# Patient Record
Sex: FEMALE | Race: BLACK OR AFRICAN AMERICAN | Employment: FULL TIME | URBAN - METROPOLITAN AREA
[De-identification: names, ages, dates, MRNs, and addresses within clinical notes are randomized per-mention and may not be internally consistent; named-entity substitution may affect disease eponyms.]

---

## 2017-01-10 ENCOUNTER — HOSPITAL ENCOUNTER (EMERGENCY)
Age: 22
Discharge: HOME | End: 2017-01-10
Attending: EMERGENCY MEDICINE
Payer: COMMERCIAL

## 2017-01-10 VITALS
BODY MASS INDEX: 18.07 KG/M2 | HEIGHT: 63 IN | OXYGEN SATURATION: 99 % | SYSTOLIC BLOOD PRESSURE: 128 MMHG | TEMPERATURE: 98 F | DIASTOLIC BLOOD PRESSURE: 89 MMHG | WEIGHT: 102 LBS | RESPIRATION RATE: 15 BRPM | HEART RATE: 94 BPM

## 2017-01-10 DIAGNOSIS — R09.81 NASAL CONGESTION: ICD-10-CM

## 2017-01-10 DIAGNOSIS — R51.9 FACIAL PAIN: Primary | ICD-10-CM

## 2017-01-10 PROCEDURE — 99281 EMR DPT VST MAYX REQ PHY/QHP: CPT

## 2017-01-10 RX ORDER — IBUPROFEN 600 MG/1
600 TABLET ORAL
Status: DISCONTINUED | OUTPATIENT
Start: 2017-01-10 | End: 2017-01-10 | Stop reason: HOSPADM

## 2017-01-10 RX ORDER — TRIAMCINOLONE ACETONIDE 55 UG/1
2 SPRAY, METERED NASAL DAILY
Qty: 1 BOTTLE | Refills: 0 | Status: SHIPPED | OUTPATIENT
Start: 2017-01-10

## 2017-01-10 RX ORDER — OXYMETAZOLINE HCL 0.05 %
2 SPRAY, NON-AEROSOL (ML) NASAL 2 TIMES DAILY
Qty: 1 EACH | Refills: 0 | Status: SHIPPED | OUTPATIENT
Start: 2017-01-10 | End: 2017-01-13

## 2017-01-10 NOTE — DISCHARGE INSTRUCTIONS
Head or Face Pain: Care Instructions  Your Care Instructions  Common causes of head or face pain are allergies, stress, and injuries. Other causes include tooth problems and sinus infections. Eating certain foods, such as chocolate or cheese, or drinking certain liquids, such as coffee or cola, can cause head pain for some people. If you have mild head pain, you may not need treatment. It is important to watch your symptoms and talk to your doctor if your pain continues or gets worse. Follow-up care is a key part of your treatment and safety. Be sure to make and go to all appointments, and call your doctor if you are having problems. It's also a good idea to know your test results and keep a list of the medicines you take. How can you care for yourself at home? · Take pain medicines exactly as directed. ¨ If the doctor gave you a prescription medicine for pain, take it as prescribed. ¨ If you are not taking a prescription pain medicine, ask your doctor if you can take an over-the-counter pain medicine. · Take it easy for the next few days or longer if you are not feeling well. · Use a warm, moist towel or heating pad set on low to relax tight muscles in your shoulder and neck. Have someone gently massage your neck and shoulders. · Put ice or a cold pack on the area for 10 to 20 minutes at a time. Put a thin cloth between the ice and your skin. When should you call for help? Call 911 anytime you think you may need emergency care. For example, call if:  · You have twitching, jerking, or a seizure. · You passed out (lost consciousness). · You have symptoms of a stroke. These may include:  ¨ Sudden numbness, tingling, weakness, or loss of movement in your face, arm, or leg, especially on only one side of your body. ¨ Sudden vision changes. ¨ Sudden trouble speaking. ¨ Sudden confusion or trouble understanding simple statements. ¨ Sudden problems with walking or balance.   ¨ A sudden, severe headache that is different from past headaches. · You have jaw pain and pain in your chest, shoulder, neck, or arm. Call your doctor now or seek immediate medical care if:  · You have a fever with a stiff neck or a severe headache. · You have nausea and vomiting, or you cannot keep food or liquids down. Watch closely for changes in your health, and be sure to contact your doctor if:  · Your head or face pain does not get better as expected. Where can you learn more? Go to http://whit-srini.info/. Enter P568 in the search box to learn more about \"Head or Face Pain: Care Instructions. \"  Current as of: May 27, 2016  Content Version: 11.1  © 4886-4912 [employer]. Care instructions adapted under license by [employer] (which disclaims liability or warranty for this information). If you have questions about a medical condition or this instruction, always ask your healthcare professional. [place] [address] any warranty or liability for your use of this information. Rhinitis: Care Instructions  Your Care Instructions  Rhinitis is swelling and irritation in the nose. Allergies and infections are often the cause. Your nose may run or feel stuffy. Other symptoms are itchy and sore eyes, ears, throat, and mouth. If allergies are the cause, your doctor may do tests to find out what you are allergic to. You may be able to stop symptoms if you avoid the things that cause them. Your doctor may suggest or prescribe medicine to ease your symptoms. Follow-up care is a key part of your treatment and safety. Be sure to make and go to all appointments, and call your doctor if you are having problems. It's also a good idea to know your test results and keep a list of the medicines you take. How can you care for yourself at home? · If your rhinitis is caused by allergies, try to find out what sets off (triggers) your symptoms.  Take steps to avoid these triggers. ¨ Avoid yard work. It can stir up both pollen and mold. ¨ Do not smoke or allow others to smoke around you. If you need help quitting, talk to your doctor about stop-smoking programs and medicines. These can increase your chances of quitting for good. ¨ Do not use aerosol sprays, cleaning products, or perfumes. ¨ If pollen is one of your triggers, close your house and car windows during blooming season. ¨ Clean your house often to control dust.  ¨ Keep pets outside. · If your doctor recommends over-the-counter medicines to relieve symptoms, take your medicines exactly as prescribed. Call your doctor if you think you are having a problem with your medicine. · Use saline (saltwater) nasal washes to help keep your nasal passages open and wash out mucus and bacteria. You can buy saline nose drops at a grocery store or drugstore. Or you can make your own at home by adding 1 teaspoon of salt and 1 teaspoon of baking soda to 2 cups of distilled water. If you make your own, fill a bulb syringe with the solution, insert the tip into your nostril, and squeeze gently. Willia Slocumb your nose. When should you call for help? Call your doctor now or seek immediate medical care if:  · You are having trouble breathing. Watch closely for changes in your health, and be sure to contact your doctor if:  · Mucus from your nose gets thicker (like pus) or has new blood in it. · You have new or worse symptoms. · You do not get better as expected. Where can you learn more? Go to http://whit-srini.info/. Enter M030 in the search box to learn more about \"Rhinitis: Care Instructions. \"  Current as of: July 29, 2016  Content Version: 11.1  © 4509-4261 [employer]. Care instructions adapted under license by [employer] (which disclaims liability or warranty for this information).  If you have questions about a medical condition or this instruction, always ask your healthcare jory. [address] any warranty or liability for your use of this information. Saline Nasal Washes: Care Instructions  Your Care Instructions  Saline nasal washes help keep the nasal passages open by washing out thick or dried mucus. This simple remedy can help relieve symptoms of allergies, sinusitis, and colds. It also can make the nose feel more comfortable by keeping the mucous membranes moist. You may notice a little burning sensation in your nose the first few times you use the solution, but this usually gets better in a few days. Follow-up care is a key part of your treatment and safety. Be sure to make and go to all appointments, and call your doctor if you are having problems. It's also a good idea to know your test results and keep a list of the medicines you take. How can you care for yourself at home? · You can buy premixed saline solution in a squeeze bottle or other sinus rinse products at a drugstore. Read and follow the instructions on the label. · You also can make your own saline solution by adding 1 teaspoon of salt and 1 teaspoon of baking soda to 2 cups of distilled water. · If you use a homemade solution, pour a small amount into a clean bowl. Using a rubber bulb syringe, squeeze the syringe and place the tip in the salt water. Pull a small amount of the salt water into the syringe by relaxing your hand. · Sit down with your head tilted slightly back. Do not lie down. Put the tip of the bulb syringe or the squeeze bottle a little way into one of your nostrils. Gently drip or squirt a few drops into the nostril. Repeat with the other nostril. Some sneezing and gagging are normal at first.  · Gently blow your nose. · Wipe the syringe or bottle tip clean after each use. · Repeat this 2 or 3 times a day. · Use nasal washes gently if you have nosebleeds often. When should you call for help?   Watch closely for changes in your health, and be sure to contact your doctor if:  · You often get nosebleeds. · You have problems doing the nasal washes. Where can you learn more? Go to http://whit-srini.info/. Enter [id number] [id number] in the search box to learn more about \"Saline Nasal Washes: Care Instructions. \"  Current as of: July 29, 2016  Content Version: 11.1  © 9581-8695 [employer]. Care instructions adapted under license by [employer] (which disclaims liability or warranty for this information). If you have questions about a medical condition or this instruction, always ask your healthcare professional. [place] [address] any warranty or liability for your use of this information. Thank you for allowing us to provide you with excellent care today. We hope we addressed all of your concerns and needs. We strive to provide excellent quality care in the Emergency Department. Please rate us as excellent, as anything less than excellent does not meet our expectations. If you feel that you have not received excellent quality care or timely care, please ask to speak to the nurse manager. Please choose us in the future for your continued health care needs. The exam and treatment you received in the Emergency Department were for an urgent problem and are not intended as complete care. It is important that you follow-up with a doctor, nurse practitioner, or  [id number] assistant to: (1) confirm your diagnosis, (2) re-evaluation of changes in your illness and treatment, and (3) for ongoing care. If your symptoms become worse or you do not improve as expected and you are unable to reach your usual health care provider, you should return to the Emergency Department. We are available 24 hours a day. Take this sheet with you when you go to your follow-up visit. If you have any problem arranging the follow-up visit, contact the Emergency Department immediately.      Make an appointment with your Primary Care doctor for follow up of this visit. Return to the ER if you are unable to be seen in the time recommended on your discharge instructions. Leo Dmitrysasha Pagé FNP-BC    Pseudoephedrine (By mouth)   Pseudoephedrine ([id number]-rin)  Treats stuffy nose and sinuses. Brand Name(s):12 Hour Decongestant, 12-Hour Cold Relief, Biofed, Children's Sudafed, Congestion, Contac 12-Hour, Genaphed, [facility] Nasal Decongestant, [facility] Suphedrin, [facility] Suphedrine Children's, Good Sense 12 Hour Decongestant, Good Sense Nasal Decongestant, Kodet Se, Nasal Decongestant, Nexafed   There may be other brand names for this medicine. When This Medicine Should Not Be Used: You should not use this medicine if you have ever had an allergic reaction to pseudoephedrine. Do not give any over-the-counter (OTC) cough and cold medicine to a baby or child under 3years old. Using these medicines in very young children might cause serious or possibly life-threatening side effects. How to Use This Medicine:   Long Acting Capsule, Capsule, Long Acting Tablet, Tablet, Liquid, Chewable Tablet  · Your doctor will tell you how much medicine to use. Do not use more than directed. · Follow the instructions on the medicine label if you are using this medicine without a prescription. If this medicine makes you feel restless, avoid taking it at bedtime. · Swallow the capsule or tablet whole. Do not crush, chew, or break. · The chewable tablet must be chewed completely before you swallow it. Do not swallow it whole. · Measure the oral liquid medicine with a marked measuring spoon, oral syringe, or medicine cup. If a dose is missed:   · Take a dose as soon as you remember. If it is almost time for your next dose, wait until then and take a regular dose. Do not take extra medicine to make up for a missed dose.   How to Store and Dispose of This Medicine:   · Store the medicine in a closed container at room temperature, away from heat, moisture, and direct light. · Ask your pharmacist, doctor, or health caregiver about the best way to dispose of any outdated medicine or medicine no longer needed. · Keep all medicine out of the reach of children. Never share your medicine with anyone. Drugs and Foods to Avoid:   Ask your doctor or pharmacist before using any other medicine, including over-the-counter medicines, vitamins, and herbal products. · You should not use pseudoephedrine with cocaine or monoamine oxidase (MAO) inhibitors (Parnate®, Marplan®, Eldepryl®). Warnings While Using This Medicine:   · Check with your doctor before using if you are pregnant or breastfeeding, or if you have heart disease, high blood pressure, diabetes, trouble urinating due to an enlarged prostate, or an overactive thyroid. · You should not use this medicine for more than 7 days unless ordered by a doctor. · If you do not get better after using this medicine for 7 days, or if you have a fever, talk to your doctor. Possible Side Effects While Using This Medicine:   Call your doctor right away if you notice any of these side effects:  · Allergic reaction: Itching or hives, swelling in your face or hands, swelling or tingling in your mouth or throat, chest tightness, trouble breathing  · Chest pain  · Very fast, pounding, or irregular heartbeat  · Wheezing or shortness of breath  · Convulsions (seizures)  · Severe restlessness  · Hallucinations  If you notice these less serious side effects, talk with your doctor:   · Nervousness or dizziness  · Trouble sleeping  · Nausea or upset stomach  · Headache  If you notice other side effects that you think are caused by this medicine, tell your doctor. Call your doctor for medical advice about side effects.  You may report side effects to FDA at 6-989-FDA-0108  © 2016 [address] is for End User's use only and may not be sold, redistributed or otherwise used for commercial purposes. The above information is an  only. It is not intended as medical advice for individual conditions or treatments. Talk to your doctor, nurse or pharmacist before following any medical regimen to see if it is safe and effective for you.

## 2017-01-10 NOTE — ED NOTES
Pt discharged by provider prior to assessment of pt/medication of ibuprofen. This nurse started assessment of pt but got pulled away to a critical pt.

## 2017-01-10 NOTE — ED NOTES
Assumed care of pt. Bed locked and in low position with call bell within reach. Using AIDET-Introduced self as Primary RN and plan discussed with pt with understanding was verbalized. Pt denies additional complaints at this time. White board updated with this nurse's name. Patient advised that medical information will be discussed and it is their own responsibility to tell nurse if such conversation should not take place in the presence of visitors. Pt verbalizes understanding. Pt's spouse at bedside.

## 2017-01-10 NOTE — ED PROVIDER NOTES
HPI Comments: 19 y.o. female with no significant past medical history who presents from home with chief complaint of facial pain. Pt c/o pain of the right face which seems to radiate from the teeth, to the ear and the eye. Pt notes that she has seen a dentist for this pain who was unable to find an infection or a source of pain. Pt reports that she has had URI sxs recently of post nasal drip, congestion and sore throat and has been taking Mucinex as a decongestant. Pt says that her pain worsens with blowing her nose. There are no other acute medical concerns at this time. PCP: Osiris Montilla MD    Note written by Figueroa Alcaraz. Latonya Allen, as dictated by Jefe Nascimento NP 3:38 PM      The history is provided by the patient. No  was used. Past Medical History:   Diagnosis Date    Genital herpes, unspecified      acyclovir no outbreaks       Past Surgical History:   Procedure Laterality Date    Hx other surgical       cryosurgery 2010 reported by patient         No family history on file. Social History     Social History    Marital status: SINGLE     Spouse name: N/A    Number of children: N/A    Years of education: N/A     Occupational History    Not on file. Social History Main Topics    Smoking status: Never Smoker    Smokeless tobacco: Never Used    Alcohol use No    Drug use: Not on file    Sexual activity: Yes     Partners: Male     Other Topics Concern    Not on file     Social History Narrative         ALLERGIES: Review of patient's allergies indicates no known allergies. Review of Systems   Constitutional: Negative. Negative for chills, diaphoresis and fever. HENT: Positive for congestion, postnasal drip and sore throat. Negative for rhinorrhea and trouble swallowing. Right sided facial pain   Eyes: Negative. Respiratory: Negative. Negative for shortness of breath. Cardiovascular: Negative. Gastrointestinal: Negative.   Negative for abdominal pain, nausea and vomiting. Endocrine: Negative. Musculoskeletal: Negative for arthralgias, myalgias, neck pain and neck stiffness. Skin: Negative. Negative for rash. Allergic/Immunologic: Negative. Neurological: Negative. Negative for dizziness, syncope, weakness and headaches. Hematological: Negative. Psychiatric/Behavioral: Negative. All other systems reviewed and are negative. Vitals:    01/10/17 1529   BP: 128/89   Pulse: 94   Resp: 15   Temp: 98 °F (36.7 °C)   SpO2: 99%   Weight: 46.3 kg (102 lb)   Height: 5' 3\" (1.6 m)            Physical Exam   Constitutional: She is oriented to person, place, and time. Vital signs are normal. She appears well-developed and well-nourished. Non-toxic appearance. She does not have a sickly appearance. She does not appear ill. HENT:   Head: Normocephalic and atraumatic. Right Ear: Tympanic membrane is bulging (slightly). Mouth/Throat: Normal dentition. No dental abscesses or dental caries. Posterior oropharyngeal erythema present. 2+ tonsils bilaterally   Eyes: Conjunctivae, EOM and lids are normal. Pupils are equal, round, and reactive to light. Neck: Trachea normal, normal range of motion and full passive range of motion without pain. Neck supple. Cardiovascular: Normal rate, regular rhythm, normal heart sounds and normal pulses. Pulmonary/Chest: Effort normal and breath sounds normal.   Abdominal: Soft. Normal appearance and bowel sounds are normal.   Musculoskeletal: Normal range of motion. Neurological: She is alert and oriented to person, place, and time. She has normal strength. GCS eye subscore is 4. GCS verbal subscore is 5. GCS motor subscore is 6. Skin: Skin is warm, dry and intact. Psychiatric: She has a normal mood and affect. Her speech is normal and behavior is normal. Judgment and thought content normal. Cognition and memory are normal.   Nursing note and vitals reviewed.    Note written by Liborio Aragon. Matt Paredes, as dictated by Cathi Matos NP 3:38 PM      MDM  Number of Diagnoses or Management Options  Facial pain: minor  Nasal congestion: minor  Risk of Complications, Morbidity, and/or Mortality  Presenting problems: minimal  Diagnostic procedures: minimal  Management options: minimal    Patient Progress  Patient progress: stable    ED Course       Procedures    LABORATORY TESTS:  No results found for this or any previous visit (from the past 12 hour(s)). IMAGING RESULTS:    MEDICATIONS GIVEN:  Medications   ibuprofen (MOTRIN) tablet 600 mg (not administered)       IMPRESSION:  1. Facial pain    2. Nasal congestion        PLAN:  1. Afrin and Nasacort  2. Motrin for pain and Sudafed as a decongestant  3. F/U with PCP  Return to ED if worse    Discharge Note  3:55 PM  The patient is ready for discharge. The patient's signs, symptoms, diagnosis, and discharge instructions have been discussed and the patient has conveyed their understanding. The patient is to follow up as recommended or return to the ER should their symptoms worsen. Plan has been discussed and the patient is in agreement. The patient left before nursing could give her the ordered Motrin    Gavin Rodriguez FNP-BC.

## 2017-04-27 ENCOUNTER — HOSPITAL ENCOUNTER (EMERGENCY)
Age: 22
Discharge: HOME | End: 2017-04-28
Attending: EMERGENCY MEDICINE | Admitting: EMERGENCY MEDICINE
Payer: COMMERCIAL

## 2017-04-27 VITALS
TEMPERATURE: 98.5 F | RESPIRATION RATE: 16 BRPM | DIASTOLIC BLOOD PRESSURE: 70 MMHG | WEIGHT: 106.7 LBS | SYSTOLIC BLOOD PRESSURE: 116 MMHG | OXYGEN SATURATION: 98 % | HEART RATE: 88 BPM | HEIGHT: 60 IN | BODY MASS INDEX: 20.95 KG/M2

## 2017-04-27 DIAGNOSIS — J02.9 SORE THROAT: Primary | ICD-10-CM

## 2017-04-27 LAB — DEPRECATED S PYO AG THROAT QL EIA: NEGATIVE

## 2017-04-27 PROCEDURE — 87880 STREP A ASSAY W/OPTIC: CPT | Performed by: PHYSICIAN ASSISTANT

## 2017-04-27 PROCEDURE — 99282 EMERGENCY DEPT VISIT SF MDM: CPT

## 2017-04-27 PROCEDURE — 87070 CULTURE OTHR SPECIMN AEROBIC: CPT | Performed by: EMERGENCY MEDICINE

## 2017-04-28 RX ORDER — CETIRIZINE HCL 10 MG
10 TABLET ORAL DAILY
Qty: 20 TAB | Refills: 0 | Status: SHIPPED | OUTPATIENT
Start: 2017-04-28 | End: 2022-08-25

## 2017-04-28 NOTE — ED PROVIDER NOTES
HPI Comments: Natalia Davis is a 23 y.o. female who presents to the ED with a c/o sore throat. Patient complains of an intermittent sore throat for approx 1 month that has worsened within the past 2 days and is worried about possibly having passed it to her son. Patient states she's also had some congestion. Patient states she has not taken any medications or seen anyone for her sx. Patient denies chest pain, fever, chills, nausea, vomiting, or diarrhea. PCP: Nola Boo MD  PMHx significant for: none  PSHx significant for: none  Social Hx: Tobacco: none. EtOH: none. Illicit drug use: none. There are no further complaints or symptoms at this time. Note written by Latonya Kauffman, as dictated by Meme Lux PA-C 10:53 PM     The history is provided by the patient. No  was used. Past Medical History:   Diagnosis Date    Genital herpes, unspecified     acyclovir no outbreaks       Past Surgical History:   Procedure Laterality Date    HX OTHER SURGICAL      cryosurgery 2010 reported by patient         No family history on file. Social History     Social History    Marital status: SINGLE     Spouse name: N/A    Number of children: N/A    Years of education: N/A     Occupational History    Not on file. Social History Main Topics    Smoking status: Never Smoker    Smokeless tobacco: Never Used    Alcohol use No    Drug use: Not on file    Sexual activity: Yes     Partners: Male     Other Topics Concern    Not on file     Social History Narrative         ALLERGIES: Review of patient's allergies indicates no known allergies. Review of Systems   Constitutional: Negative for chills and fever. HENT: Positive for congestion and sore throat. Negative for ear discharge, ear pain, mouth sores, rhinorrhea and sneezing. Eyes: Negative for redness and visual disturbance. Respiratory: Negative for shortness of breath.     Cardiovascular: Negative for chest pain and leg swelling. Gastrointestinal: Negative for abdominal pain, nausea and vomiting. Genitourinary: Negative for difficulty urinating and frequency. Musculoskeletal: Negative for back pain, myalgias and neck stiffness. Skin: Negative for rash. Neurological: Negative for dizziness, syncope, weakness and headaches. Hematological: Negative for adenopathy. Vitals:    04/27/17 2247   BP: 116/70   Pulse: 88   Resp: 16   Temp: 98.5 °F (36.9 °C)   SpO2: 98%   Weight: 48.4 kg (106 lb 11.2 oz)   Height: 5' (1.524 m)            Physical Exam   Constitutional: She is oriented to person, place, and time. She appears well-developed and well-nourished. No distress. HENT:   Head: Normocephalic and atraumatic. Right Ear: External ear normal.   Left Ear: External ear normal.   Pale boggy nares without rhinorrhea. No erythema, exudate or lesions to oropharynx, uvula midline. No trismus. Handling secretions. Neck: Neck supple. Cardiovascular: Normal rate, regular rhythm, normal heart sounds and intact distal pulses. Exam reveals no gallop and no friction rub. No murmur heard. Pulmonary/Chest: Effort normal and breath sounds normal. No stridor. No respiratory distress. She has no wheezes. She has no rales. She exhibits no tenderness. Abdominal: Soft. Bowel sounds are normal. She exhibits no distension and no mass. There is no tenderness. There is no rebound and no guarding. Musculoskeletal: Normal range of motion. She exhibits no edema, tenderness or deformity. Neurological: She is alert and oriented to person, place, and time. No cranial nerve deficit. Coordination normal.   Skin: No rash noted. No erythema. No pallor. Psychiatric: She has a normal mood and affect. Her behavior is normal.   Nursing note and vitals reviewed.        MDM  Number of Diagnoses or Management Options     Amount and/or Complexity of Data Reviewed  Clinical lab tests: ordered and reviewed  Review and summarize past medical records: yes  Independent visualization of images, tracings, or specimens: yes    Patient Progress  Patient progress: stable    ED Course       Procedures    11:10 PM  Discussed pt, sx, hx and current findings with Dr Akosua Grossman. He is in agreement with nicole Zaldivar. LIZZY Patel        LABORATORY TESTS:  Recent Results (from the past 12 hour(s))   STREP AG SCREEN, GROUP A    Collection Time: 04/27/17 11:01 PM   Result Value Ref Range    Group A Strep Ag ID NEGATIVE  NEG         IMAGING RESULTS:  No orders to display       MEDICATIONS GIVEN:  Medications - No data to display    IMPRESSION:  1. Sore throat        PLAN:  1. Current Discharge Medication List      START taking these medications    Details   cetirizine (ZYRTEC) 10 mg tablet Take 1 Tab by mouth daily. Qty: 20 Tab, Refills: 0         CONTINUE these medications which have NOT CHANGED    Details   triamcinolone (NASACORT) 55 mcg nasal inhaler 2 Sprays by Both Nostrils route daily. Qty: 1 Bottle, Refills: 0      ibuprofen (MOTRIN) 800 mg tablet Take 1 Tab by mouth every six (6) hours as needed for Pain. Qty: 30 Tab, Refills: 1      pnv w/o calcium-iron fum-fa 27-1 mg tab Take 1 Tab by mouth daily. acyclovir (ZOVIRAX) 800 mg tablet Take 800 mg by mouth two (2) times a day. 2.   Follow-up Information     Follow up With Details Comments Wiliam Umana MD Schedule an appointment as soon as possible for a visit 2-4 days for recheck Darlene Annalee CLARKE [address].  [phone]          Return to ED if worse       12:26 AM  Pt has been reexamined. Pt has no new complaints, changes or physical findings. Care plan outlined and precautions discussed. All available results were reviewed with pt. All medications were reviewed with pt. All of pt's questions and concerns were addressed. Pt agrees to F/U as instructed and agrees to return to ED upon further deterioration.  Pt is ready to go home.   Patti Gallagher PA-C

## 2017-04-28 NOTE — DISCHARGE INSTRUCTIONS
Rapid Strep Test: About This Test  What is it? A rapid strep test checks the bacteria in your throat to see if strep is the cause of your sore throat. Why is this test done? It may be done so your doctor can find out right away whether you have strep throat. There is another test for strep, called a throat culture, but that test takes a few days to get the results. How can you prepare for the test?  You don't need to do anything before you have this test.  What happens during the test?  · You will be asked to tilt your head back and open your mouth as wide as possible. · Your doctor will press your tongue down with a flat stick (tongue depressor) and then examine your mouth and throat. · A clean cotton swab will be rubbed over the back of your throat, around your tonsils, and over any red areas or sores to collect a sample. How long does the test take? · The test takes less than a minute. · Results are available in 10 to 15 minutes. When should you call for help? Call your doctor now or seek immediate medical care if:  · Your pain gets worse on one side of your throat, or you have trouble opening your mouth. · You have a new or higher fever, or you have a fever with a stiff neck or severe headache. · Swallowing becomes harder, or you have any trouble breathing. · You are sensitive to light or feel very sleepy or confused. Watch closely for changes in your health, and be sure to contact your doctor if:  · You do not start to feel better after 2 days. Follow-up care is a key part of your treatment and safety. Be sure to make and go to all appointments, and call your doctor if you are having problems. It's also a good idea to keep a list of the medicines you take. Ask your doctor when you can expect to have your test results. Where can you learn more? Go to http://whit-srini.info/.   Enter B356 in the search box to learn more about \"Rapid Strep Test: About This Test.\"  Current as of: July 29, 2016  Content Version: 11.2  © 5159-9497 [employer]. Care instructions adapted under license by [employer] (which disclaims liability or warranty for this information). If you have questions about a medical condition or this instruction, always ask your healthcare professional. [address] any warranty or liability for your use of this information. Sore Throat: Care Instructions  Your Care Instructions    Infection by bacteria or a virus causes most sore throats. Cigarette smoke, dry air, air pollution, allergies, and yelling can also cause a sore throat. Sore throats can be painful and annoying. Fortunately, most sore throats go away on their own. If you have a bacterial infection, your doctor may prescribe antibiotics. Follow-up care is a key part of your treatment and safety. Be sure to make and go to all appointments, and call your doctor if you are having problems. It's also a good idea to know your test results and keep a list of the medicines you take. How can you care for yourself at home? · If your doctor prescribed antibiotics, take them as directed. Do not stop taking them just because you feel better. You need to take the full course of antibiotics. · Gargle with warm salt water once an hour to help reduce swelling and relieve discomfort. Use 1 teaspoon of salt mixed in 1 cup of warm water. · Take an over-the-counter pain medicine, such as acetaminophen (Tylenol), ibuprofen (Advil, Motrin), or naproxen (Aleve). Read and follow all instructions on the label. · Be careful when taking over-the-counter cold or flu medicines and Tylenol at the same time. Many of these medicines have acetaminophen, which is Tylenol. Read the labels to make sure that you are not taking more than the recommended dose. Too much acetaminophen (Tylenol) can be harmful. · Drink plenty of fluids. Fluids may help soothe an irritated throat. Hot fluids, such as tea or soup, may help decrease throat pain. · Use over-the-counter throat lozenges to soothe pain. Regular cough drops or hard candy may also help. These should not be given to young children because of the risk of choking. · Do not smoke or allow others to smoke around you. If you need help quitting, talk to your doctor about stop-smoking programs and medicines. These can increase your chances of quitting for good. · Use a vaporizer or humidifier to add moisture to your bedroom. Follow the directions for cleaning the machine. When should you call for help? Call your doctor now or seek immediate medical care if:  · You have new or worse trouble swallowing. · Your sore throat gets much worse on one side. Watch closely for changes in your health, and be sure to contact your doctor if you do not get better as expected. Where can you learn more? Go to http://whit-srini.info/. Enter [phone] [id number] [phone] in the search box to learn more about \"Sore Throat: Care Instructions. \"  Current as of: July 29, 2016  Content Version: 11.2  © 0955-5474 [employer]. Care instructions adapted under license by [employer] (which disclaims liability or warranty for this information). If you have questions about a medical condition or this instruction, always ask your healthcare professional. [address] any warranty or liability for your use of this information. We hope that we have addressed all of your medical concerns. The examination and treatment you received in the Emergency Department were for an emergent problem and were not intended as complete care. It is important that you follow up with your healthcare provider(s) for ongoing care. If your symptoms worsen or do not improve as expected, and you are unable to reach your usual health care provider(s), you should return to the Emergency Department.       Today's healthcare is undergoing tremendous change, and patient satisfaction surveys are one of the many tools to assess the quality of medical care. You may receive a survey from the [employer] regarding your experience in the Emergency Department. I hope that your experience has been completely positive, particularly the medical care that I provided. As such, please participate in the survey; anything less than excellent does not meet my expectations or intentions. [facility] and [facility] participate in nationally recognized quality of care measures. If your blood pressure is greater than 120/80, as reported below, we urge that you seek medical care to address the potential of high blood pressure, commonly known as hypertension. Hypertension can be hereditary or can be caused by certain medical conditions, pain, stress, or \"white coat syndrome. \"       Please make an appointment with your health care provider(s) for follow up of your Emergency Department visit. VITALS:   Patient Vitals for the past 8 hrs:   Temp Pulse Resp BP SpO2   04/27/17 2247 98.5 °F (36.9 °C) 88 16 116/70 98 %          Thank you for allowing us to provide you with medical care today. We realize that you have many choices for your emergency care needs. Please choose us in the future for any continued health care needs. [facility]: [phone]            Recent Results (from the past 24 hour(s))   STREP AG SCREEN, GROUP A    Collection Time: 04/27/17 11:01 PM   Result Value Ref Range    Group A Strep Ag ID NEGATIVE  NEG         No results found.

## 2017-04-28 NOTE — ED NOTES
JOSE Monroy at bedside discharging patient; instructions reviewed by provider. Patient exited ED prior to RN reassessment.

## 2017-04-28 NOTE — ED NOTES
Pt reports having a sore throat tonight and thinks maybe she gave it to her son. Pt has no other complaints at this time.

## 2017-04-30 LAB
BACTERIA SPEC CULT: NORMAL
SERVICE CMNT-IMP: NORMAL

## 2019-02-28 ENCOUNTER — HOSPITAL ENCOUNTER (EMERGENCY)
Age: 24
Discharge: LEFT BEFORE DISCHARGE | End: 2019-02-28
Attending: EMERGENCY MEDICINE
Payer: COMMERCIAL

## 2019-02-28 PROCEDURE — 75810000275 HC EMERGENCY DEPT VISIT NO LEVEL OF CARE

## 2022-02-09 LAB
ANTIBODY SCREEN, EXTERNAL: NEGATIVE
HBSAG, EXTERNAL: NEGATIVE
HEPATITIS C AB,   EXT: NEGATIVE
HIV, EXTERNAL: NEGATIVE
RPR, EXTERNAL: NON REACTIVE
RUBELLA, EXTERNAL: NORMAL

## 2022-05-18 ENCOUNTER — HOSPITAL ENCOUNTER (OUTPATIENT)
Dept: PERINATAL CARE | Age: 27
Discharge: HOME | End: 2022-05-18
Attending: OBSTETRICS & GYNECOLOGY
Payer: COMMERCIAL

## 2022-05-18 PROCEDURE — 76811 OB US DETAILED SNGL FETUS: CPT | Performed by: OBSTETRICS & GYNECOLOGY

## 2022-05-18 PROCEDURE — 76812 OB US DETAILED ADDL FETUS: CPT | Performed by: OBSTETRICS & GYNECOLOGY

## 2022-06-15 ENCOUNTER — HOSPITAL ENCOUNTER (OUTPATIENT)
Dept: PERINATAL CARE | Age: 27
Discharge: HOME | End: 2022-06-15
Attending: OBSTETRICS & GYNECOLOGY
Payer: COMMERCIAL

## 2022-06-15 PROCEDURE — 76816 OB US FOLLOW-UP PER FETUS: CPT | Performed by: OBSTETRICS & GYNECOLOGY

## 2022-07-06 ENCOUNTER — HOSPITAL ENCOUNTER (INPATIENT)
Age: 27
LOS: 2 days | Discharge: HOME | DRG: 833 | End: 2022-07-10
Attending: OBSTETRICS & GYNECOLOGY | Admitting: STUDENT IN AN ORGANIZED HEALTH CARE EDUCATION/TRAINING PROGRAM
Payer: COMMERCIAL

## 2022-07-06 PROBLEM — Z34.90 PREGNANCY: Status: ACTIVE | Noted: 2022-07-06

## 2022-07-06 LAB
APPEARANCE UR: CLEAR
BACTERIA URNS QL MICRO: NEGATIVE /HPF
BILIRUB UR QL: NEGATIVE
COLOR UR: NORMAL
EPITH CASTS URNS QL MICRO: NORMAL /LPF
FIBRONECTIN FETAL VAG QL: POSITIVE
GLUCOSE UR STRIP.AUTO-MCNC: NEGATIVE MG/DL
HGB UR QL STRIP: NEGATIVE
HYALINE CASTS URNS QL MICRO: NORMAL /LPF (ref 0–2)
KETONES UR QL STRIP.AUTO: NEGATIVE MG/DL
LEUKOCYTE ESTERASE UR QL STRIP.AUTO: NEGATIVE
NITRITE UR QL STRIP.AUTO: NEGATIVE
PH UR STRIP: 7 [PH] (ref 5–8)
PROT UR STRIP-MCNC: NEGATIVE MG/DL
RBC #/AREA URNS HPF: NORMAL /HPF (ref 0–5)
SP GR UR REFRACTOMETRY: 1.01 (ref 1–1.03)
UA: UC IF INDICATED,UAUC: NORMAL
UROBILINOGEN UR QL STRIP.AUTO: 1 EU/DL (ref 0.2–1)
WBC URNS QL MICRO: NORMAL /HPF (ref 0–4)

## 2022-07-06 PROCEDURE — 74011250637 HC RX REV CODE- 250/637: Performed by: OBSTETRICS & GYNECOLOGY

## 2022-07-06 PROCEDURE — 2709999900 HC NON-CHARGEABLE SUPPLY

## 2022-07-06 PROCEDURE — 82731 ASSAY OF FETAL FIBRONECTIN: CPT

## 2022-07-06 PROCEDURE — G0378 HOSPITAL OBSERVATION PER HR: HCPCS

## 2022-07-06 PROCEDURE — 74011250636 HC RX REV CODE- 250/636: Performed by: OBSTETRICS & GYNECOLOGY

## 2022-07-06 PROCEDURE — 81001 URINALYSIS AUTO W/SCOPE: CPT

## 2022-07-06 PROCEDURE — 51702 INSERT TEMP BLADDER CATH: CPT

## 2022-07-06 PROCEDURE — 0T9B70Z DRAINAGE OF BLADDER WITH DRAINAGE DEVICE, VIA NATURAL OR ARTIFICIAL OPENING: ICD-10-PCS | Performed by: OBSTETRICS & GYNECOLOGY

## 2022-07-06 PROCEDURE — 96361 HYDRATE IV INFUSION ADD-ON: CPT

## 2022-07-06 PROCEDURE — 96372 THER/PROPH/DIAG INJ SC/IM: CPT

## 2022-07-06 PROCEDURE — 96366 THER/PROPH/DIAG IV INF ADDON: CPT

## 2022-07-06 PROCEDURE — 96365 THER/PROPH/DIAG IV INF INIT: CPT

## 2022-07-06 RX ORDER — BETAMETHASONE SODIUM PHOSPHATE AND BETAMETHASONE ACETATE 3; 3 MG/ML; MG/ML
12 INJECTION, SUSPENSION INTRA-ARTICULAR; INTRALESIONAL; INTRAMUSCULAR; SOFT TISSUE EVERY 24 HOURS
Status: COMPLETED | OUTPATIENT
Start: 2022-07-06 | End: 2022-07-07

## 2022-07-06 RX ORDER — INDOMETHACIN 25 MG/1
25 CAPSULE ORAL EVERY 4 HOURS
Status: DISPENSED | OUTPATIENT
Start: 2022-07-07 | End: 2022-07-09

## 2022-07-06 RX ORDER — CALCIUM GLUCONATE 20 MG/ML
1 INJECTION, SOLUTION INTRAVENOUS AS NEEDED
Status: DISCONTINUED | OUTPATIENT
Start: 2022-07-06 | End: 2022-07-10 | Stop reason: HOSPADM

## 2022-07-06 RX ORDER — INDOMETHACIN 25 MG/1
50 CAPSULE ORAL
Status: COMPLETED | OUTPATIENT
Start: 2022-07-06 | End: 2022-07-06

## 2022-07-06 RX ORDER — ACYCLOVIR 800 MG/1
400 TABLET ORAL 3 TIMES DAILY
Status: DISCONTINUED | OUTPATIENT
Start: 2022-07-06 | End: 2022-07-10 | Stop reason: HOSPADM

## 2022-07-06 RX ORDER — MAGNESIUM SULFATE HEPTAHYDRATE 40 MG/ML
4 INJECTION, SOLUTION INTRAVENOUS ONCE
Status: COMPLETED | OUTPATIENT
Start: 2022-07-06 | End: 2022-07-06

## 2022-07-06 RX ORDER — MAGNESIUM SULFATE HEPTAHYDRATE 40 MG/ML
2 INJECTION, SOLUTION INTRAVENOUS CONTINUOUS
Status: DISCONTINUED | OUTPATIENT
Start: 2022-07-06 | End: 2022-07-07

## 2022-07-06 RX ORDER — SODIUM CHLORIDE, SODIUM LACTATE, POTASSIUM CHLORIDE, CALCIUM CHLORIDE 600; 310; 30; 20 MG/100ML; MG/100ML; MG/100ML; MG/100ML
75 INJECTION, SOLUTION INTRAVENOUS CONTINUOUS
Status: DISCONTINUED | OUTPATIENT
Start: 2022-07-06 | End: 2022-07-07

## 2022-07-06 RX ORDER — SODIUM CHLORIDE, SODIUM LACTATE, POTASSIUM CHLORIDE, CALCIUM CHLORIDE 600; 310; 30; 20 MG/100ML; MG/100ML; MG/100ML; MG/100ML
75 INJECTION, SOLUTION INTRAVENOUS CONTINUOUS
Status: DISCONTINUED | OUTPATIENT
Start: 2022-07-06 | End: 2022-07-06

## 2022-07-06 RX ADMIN — SODIUM CHLORIDE, POTASSIUM CHLORIDE, SODIUM LACTATE AND CALCIUM CHLORIDE 500 ML: 600; 310; 30; 20 INJECTION, SOLUTION INTRAVENOUS at 14:19

## 2022-07-06 RX ADMIN — ACYCLOVIR 400 MG: 800 TABLET ORAL at 22:05

## 2022-07-06 RX ADMIN — INDOMETHACIN 50 MG: 25 CAPSULE ORAL at 22:05

## 2022-07-06 RX ADMIN — MAGNESIUM SULFATE HEPTAHYDRATE 4 G: 40 INJECTION, SOLUTION INTRAVENOUS at 15:30

## 2022-07-06 RX ADMIN — BETAMETHASONE SODIUM PHOSPHATE AND BETAMETHASONE ACETATE 12 MG: 3; 3 INJECTION, SUSPENSION INTRA-ARTICULAR; INTRALESIONAL; INTRAMUSCULAR at 15:27

## 2022-07-06 RX ADMIN — SODIUM CHLORIDE, SODIUM LACTATE, POTASSIUM CHLORIDE, AND CALCIUM CHLORIDE 75 ML/HR: 600; 310; 30; 20 INJECTION, SOLUTION INTRAVENOUS at 15:30

## 2022-07-06 RX ADMIN — SODIUM CHLORIDE, SODIUM LACTATE, POTASSIUM CHLORIDE, AND CALCIUM CHLORIDE 999 ML/HR: 600; 310; 30; 20 INJECTION, SOLUTION INTRAVENOUS at 21:38

## 2022-07-06 RX ADMIN — MAGNESIUM SULFATE HEPTAHYDRATE 2 G/HR: 40 INJECTION, SOLUTION INTRAVENOUS at 15:53

## 2022-07-06 NOTE — PROGRESS NOTES
1258: Pt arrives ambulatory to L&D, sent from [facility] office by MD Yost or fetal monitoring. Pt told MD Yost that she thinks she lost her mucus plug last night. Pt denies any contraction pain but states that she has back pain but states she has had back pain throughout her pregnancy. Pt is pregnant with twins. Pt has positive fetal movement, denies loss of fluid or vaginal bleeding. Pt denies any complications with this pregnancy so far. Pt denies any significant history. 1326: MD Yost at bedside to see patient. MD using ultrasound to assist this RN in finding placement to put US on both babies. 1331: Pt placed on EFM. Baby A on left and Baby B on right. [id number]: RN remained at bedside trying to keep patient on EFM. 1425: MD Yost made aware of FFN being positive. MD states to keep patient on monitor. MD made aware that TOCO is picking up a lot of irritability d/t patient laying on her side. MD to come up with POC and will jamarcus this RN back with plan. 1527: First dose of betamethasone given at this time in left gluteus gustavo. 1530: Magnesium bolus of 4 grams started at this time. 1553: Magnesium drip of 2g/hr started at this time. 1658: MD Yost at bedside to see patient. 1742: Reactive NST noted on Baby A and Baby B at this time. Verified with Darin Galdamez RN. 1900: Bedside and Verbal shift change report given to Sampson Jones (oncoming nurse) by Meryle Meter RN (offgoing nurse). Report included the following information SBAR, Kardex, Intake/Output, MAR and Recent Results.

## 2022-07-06 NOTE — H&P
History & Physical    Name: Annie Mahoney MRN: [id number]  SSN: [id number]    YOB: 1995  Age: 32 y.o. Sex: female      Subjective:     Reason for Admission:  Pregnancy and  dilation    History of Present Illness: Ms. Leda Eugene is a 32 y.o.  female with an estimated gestational 34 4/7 weeks : None noted. . Patient complains of passing her mucus plug last night. Pregnancy has been complicated by twins. Patient denies abdominal pain  , contractions, vaginal bleeding  and vaginal leaking of fluid . OB History    Para Term  AB Living   2 1 1     1   SAB IAB Ectopic Molar Multiple Live Births           0 1      # Outcome Date GA Lbr True/2nd Weight Sex Delivery Anes PTL Lv   2 Current            1 Term 09/26/15 40w4d  3.535 kg M VAGINAL DELI EPIDURAL AN N KEO     Past Medical History:   Diagnosis Date    Genital herpes, unspecified     acyclovir no outbreaks     Past Surgical History:   Procedure Laterality Date    HX OTHER SURGICAL      cryosurgery  reported by patient     Social History     Occupational History    Not on file   Tobacco Use    Smoking status: Never Smoker    Smokeless tobacco: Never Used   Substance and Sexual Activity    Alcohol use: No    Drug use: Not on file    Sexual activity: Yes     Partners: Male      No family history on file. No Known Allergies  Prior to Admission medications    Medication Sig Start Date End Date Taking? Authorizing Provider   cetirizine (ZYRTEC) 10 mg tablet Take 1 Tab by mouth daily. 17   Chidi Patel PA   triamcinolone (NASACORT) 55 mcg nasal inhaler 2 Sprays by Both Nostrils route daily. 1/10/17   Sarah Tavares, JOANNE   ibuprofen (MOTRIN) 800 mg tablet Take 1 Tab by mouth every six (6) hours as needed for Pain. 9/28/15   Raghav Ellsworth MD   pnv w/o calcium-iron fum-fa 27-1 mg tab Take 1 Tab by mouth daily.     Provider, Historical   acyclovir (ZOVIRAX) 800 mg tablet Take 800 mg by mouth two (2) times a day. Provider, Historical        Review of Systems:  A comprehensive review of systems was negative except for that written in the History of Present Illness. Objective:     Vitals: There were no vitals filed for this visit. No data recorded. No data recorded     Physical Exam:  Patient without distress. Heart: Regular rate and rhythm  Lung: normal respiratory effort  Abdomen: soft, nontender  Fundus: soft and non tender  Cervical Exam: 2 cm dilated    30% effaced    -3 station    Lower Extremities:  - Edema No     Membranes:  Intact  Uterine Activity:  None reported  Fetal Heart Rate:  FHTs x 2.        Lab/Data Review:  No results found for this or any previous visit (from the past 24 hour(s)). Assessment and Plan:29 4/7 wk Twin IUP with  dilation, here to r/o  labor. fFN sent, will monitor. Active Problems:    * No active hospital problems. *     - Twin Pregnancy:  monitor as above.

## 2022-07-06 NOTE — PROGRESS NOTES
High Risk Obstetrics Progress Note    Name: Keshia Figueredo MRN: [id number]  SSN: [id number]    YOB: 1995  Age: 32 y.o. Sex: female      Subjective:      LOS: 0 days    Estimated Date of Delivery: 22   Gestational Age Today: 32w2d     Patient admitted for twins and suspected  labor. States she does have normal fetal movement and does not have vaginal bleeding  and vaginal leaking of fluid . Feels occ tightening. Objective:     Vitals:  Blood pressure 109/68, pulse (!) 101, temperature 98.2 °F (36.8 °C), resp. rate 18, height 5' 3\" (1.6 m), weight 64.4 kg (142 lb), SpO2 100 %, unknown if currently breastfeeding. Temp (24hrs), Av.4 °F (36.9 °C), Min:98.2 °F (36.8 °C), Max:98.6 °F (37 °C)    Systolic (80JSA), ZCH:059 , Min:105 , UOQ:051      Diastolic (22OLD), FYQ:18, Min:60, Max:72       Intake and Output:     Date 22 0700 - 22 0659   Shift 4955-4503 6359-6178 8386-7001 24 Hour Total   INTAKE   I.V.(mL/kg/hr)  205.8  205.8   Shift Total(mL/kg)  205.8(3.2)  205.8(3.2)   OUTPUT   Urine(mL/kg/hr)  550  550   Shift Total(mL/kg)  550(8.5)  550(8.5)   Weight (kg) 64.4 64.4 64.4 64.4       Physical Exam:  Patient without distress. Abdomen: soft, nontender  Fundus: soft and non tender  Lower Extremities:  - Edema No       Membranes:  intact    Uterine Activity:  irritability/UCs every 2-4 min    Fetal Heart Rate:  Stable x 2        Labs:   Recent Results (from the past 36 hour(s))   FETAL FIBRONECTIN    Collection Time: 22  1:37 PM   Result Value Ref Range    Fetal fibronectin Positive (A) NEG         Assessment and Plan:29 4/7 wk twin IUP with apparent PTL on magnesium sulfate and getting steroids. Consider indocin if UCs don't slow down       Active Problems:    Pregnancy (2022)       Twin Pregnancy:  continue current mgmt.

## 2022-07-06 NOTE — PROGRESS NOTES
2022  3:51 PM    CM met with LEONARDO to complete initial assessment and begin discharge planning. MOB verified and confirmed demographics. LEONARDO lives with JUANA- Mahamed Bernabe ( [phone]), along wit her 6yr old, at the address on file. LEONARDO is employed and plans to take 18wks off from work. FOERMIAS is also employed and will be taking adequate time off. LEONARDO reports she has good family support, and feels like she has the support she needs when she returns home. LEONARDO plans to breast  feed baby and would like information on how to order a pump. [facility] will provide follow up care for infant. LEONARDO has car seat, bassinet/crib, clothing, bottles and all necessary supplies for baby. LEONARDO has [employer], howevere MOB noted that she will be adding babies to Medicaid. CM discussed process to add baby to insurance, MOB verbalized understanding. LEONARDO is , 29w4d, twin IUP with  dilation. CM following for needs. Medicare Outpatient Observation Notice (MOON)/ [place] Outpatient Observation Notice (Laila López) provided to patient/representative with verbal explanation of the notice. Time allotted for questions regarding the notice. Patient /representative provided a completed copy of the MOON/VOON notice. CM provided MOB with information for [facility], for breast pump order. Care Management Interventions  PCP Verified by CM: Yes ([facility])  Mode of Transport at Discharge:  Other (see comment)  Transition of Care Consult (CM Consult): Discharge Planning  Support Systems: Spouse/Significant Other,Other Family Member(s)  Confirm Follow Up Transport: Family  Discharge Location  Patient Expects to be Discharged to[de-identified] Home with family assistance  Shanon Gustafson

## 2022-07-07 PROBLEM — O60.03 PRETERM LABOR IN THIRD TRIMESTER WITHOUT DELIVERY: Status: ACTIVE | Noted: 2022-07-07

## 2022-07-07 PROBLEM — O30.043 DICHORIONIC DIAMNIOTIC TWIN PREGNANCY IN THIRD TRIMESTER: Status: ACTIVE | Noted: 2022-07-07

## 2022-07-07 PROBLEM — Z34.90 PREGNANCY: Status: RESOLVED | Noted: 2022-07-06 | Resolved: 2022-07-07

## 2022-07-07 PROCEDURE — 99253 IP/OBS CNSLTJ NEW/EST LOW 45: CPT | Performed by: OBSTETRICS & GYNECOLOGY

## 2022-07-07 PROCEDURE — G0378 HOSPITAL OBSERVATION PER HR: HCPCS

## 2022-07-07 PROCEDURE — 74011250637 HC RX REV CODE- 250/637: Performed by: OBSTETRICS & GYNECOLOGY

## 2022-07-07 PROCEDURE — 76816 OB US FOLLOW-UP PER FETUS: CPT | Performed by: OBSTETRICS & GYNECOLOGY

## 2022-07-07 PROCEDURE — 87081 CULTURE SCREEN ONLY: CPT

## 2022-07-07 PROCEDURE — 96372 THER/PROPH/DIAG INJ SC/IM: CPT

## 2022-07-07 PROCEDURE — 96366 THER/PROPH/DIAG IV INF ADDON: CPT

## 2022-07-07 PROCEDURE — 76819 FETAL BIOPHYS PROFIL W/O NST: CPT | Performed by: OBSTETRICS & GYNECOLOGY

## 2022-07-07 PROCEDURE — 74011250636 HC RX REV CODE- 250/636: Performed by: OBSTETRICS & GYNECOLOGY

## 2022-07-07 RX ADMIN — INDOMETHACIN 25 MG: 25 CAPSULE ORAL at 10:00

## 2022-07-07 RX ADMIN — MAGNESIUM SULFATE HEPTAHYDRATE 2 G/HR: 40 INJECTION, SOLUTION INTRAVENOUS at 01:49

## 2022-07-07 RX ADMIN — INDOMETHACIN 25 MG: 25 CAPSULE ORAL at 16:27

## 2022-07-07 RX ADMIN — MAGNESIUM SULFATE HEPTAHYDRATE 2 G/HR: 40 INJECTION, SOLUTION INTRAVENOUS at 12:42

## 2022-07-07 RX ADMIN — SODIUM CHLORIDE, SODIUM LACTATE, POTASSIUM CHLORIDE, AND CALCIUM CHLORIDE 75 ML/HR: 600; 310; 30; 20 INJECTION, SOLUTION INTRAVENOUS at 04:37

## 2022-07-07 RX ADMIN — BETAMETHASONE SODIUM PHOSPHATE AND BETAMETHASONE ACETATE 12 MG: 3; 3 INJECTION, SUSPENSION INTRA-ARTICULAR; INTRALESIONAL; INTRAMUSCULAR at 16:24

## 2022-07-07 RX ADMIN — ACYCLOVIR 400 MG: 800 TABLET ORAL at 09:00

## 2022-07-07 RX ADMIN — ACYCLOVIR 400 MG: 800 TABLET ORAL at 16:27

## 2022-07-07 RX ADMIN — INDOMETHACIN 25 MG: 25 CAPSULE ORAL at 20:58

## 2022-07-07 RX ADMIN — INDOMETHACIN 25 MG: 25 CAPSULE ORAL at 03:32

## 2022-07-07 RX ADMIN — ACYCLOVIR 400 MG: 800 TABLET ORAL at 22:19

## 2022-07-07 NOTE — PROGRESS NOTES
0700 - Verbal shift change report given to SE Shukla, THU and GIANNI Taylor RN (oncoming nurse) by Elliot Grover. Madonna Galicia RN (offgoing nurse). Report included the following information SBAR, Kardex, Intake/Output, MAR, Recent Results and Med Rec Status. 1500 - Patient to [facility]. 1620 - Patient back from [facility]. 1900 - Verbal shift change report given to Apurva Cantor RN (oncoming nurse) by Elliot Grover. Tila Shukla RN and GIANNI Taylor RN (offgoing nurse). Report included the following information SBAR, Kardex, Intake/Output, MAR, Recent Results and Med Rec Status.

## 2022-07-07 NOTE — CONSULTS
MATERNAL FETAL MEDICINE  INPATIENT CONSULTATION    REQUESTED BY: David Cardozo MD   DATE OF CONSULT: 22    REASON FOR CONSULTATION:  labor    Tien Marie is a 32 y.o. female  at 34w10d. HPI  Maia Palma came to L&D yesterday after she thought she lost her mucus plug. She was having vaginal tightening, but did not think she was torsten. She was having contractions on EFM. She was dilated 2 cm and was started on indomethacin for tocolysis. Maia Palma has received her first dose of  corticosteroids. She has completed her magnesium for neuroprotection. She says that she is no longer feeling contractions.     Patient Active Problem List   Diagnosis Code    Dichorionic diamniotic twin pregnancy in third trimester O34.200     labor in third trimester without delivery O60.03       Past Medical History:   Diagnosis Date    Genital herpes, unspecified     acyclovir no outbreaks       Past Surgical History:   Procedure Laterality Date    HX OTHER SURGICAL      cryosurgery  reported by patient       OB History    Para Term  AB Living   2 1 1 0 0 1   SAB IAB Ectopic Molar Multiple Live Births   0 0 0 0 0 1       No Known Allergies      Current Facility-Administered Medications:     magnesium sulfate 20 gm/500 mL Sterile Water infusion, 2 g/hr, IntraVENous, CONTINUOUS, David Cardozo MD, Stopped at 22 1453    calcium gluconate 1 gram in sodium chloride (ISO-OSM) 50 mL infusion, 1 g, IntraVENous, PRN, David Cardozo MD    betamethasone (CELESTONE) injection 12 mg, 12 mg, IntraMUSCular, Q24H, Agus Yost MD, 12 mg at 22 1527    lactated Ringers infusion, 75 mL/hr, IntraVENous, CONTINUOUS, David Cardozo MD, Stopped at 22 1453    acyclovir (ZOVIRAX) tablet 400 mg, 400 mg, Oral, TID, Trista Nelson MD, 400 mg at 22 0900    indomethacin (INDOCIN) capsule 25 mg, 25 mg, Oral, Q4H, Deniz Nelson MD, 25 mg at 22 1000    Social History     Tobacco Use    Smoking status: Never Smoker    Smokeless tobacco: Never Used   Substance Use Topics    Alcohol use: No    Drug use: Not on file       History reviewed. No pertinent family history. Review of Systems   Constitutional: Negative. HENT: Negative. Eyes: Negative. Respiratory: Negative. Cardiovascular: Negative. Gastrointestinal: Negative. Genitourinary: Negative. Musculoskeletal: Negative. Skin: Negative. Neurological: Negative. Endo/Heme/Allergies: Negative. Psychiatric/Behavioral: Negative. Visit Vitals  /65   Pulse 94   Temp 97.7 °F (36.5 °C)   Resp 16   Ht 5' 3\" (1.6 m)   Wt 64.4 kg (142 lb)   SpO2 98%   BMI 25.15 kg/m²       Gen: Comfortable, NAD  Resp: Comfortable respirations  CV: Well perfused  Abd: Gravid, NT, ND  Ext: Moving all extremities well  Neuro: Alert, interactive, appropriate    Recent Results (from the past 24 hour(s))   URINALYSIS W/ REFLEX CULTURE    Collection Time: 22  6:35 PM    Specimen: Urine   Result Value Ref Range    Color YELLOW/STRAW      Appearance CLEAR CLEAR      Specific gravity 1.011 1.003 - 1.030      pH (UA) 7.0 5.0 - 8.0      Protein Negative NEG mg/dL    Glucose Negative NEG mg/dL    Ketone Negative NEG mg/dL    Bilirubin Negative NEG      Blood Negative NEG      Urobilinogen 1.0 0.2 - 1.0 EU/dL    Nitrites Negative NEG      Leukocyte Esterase Negative NEG      UA:UC IF INDICATED CULTURE NOT INDICATED BY UA RESULT CNI      WBC 0-4 0 - 4 /hpf    RBC 0-5 0 - 5 /hpf    Epithelial cells FEW FEW /lpf    Bacteria Negative NEG /hpf    Hyaline cast 0-2 0 - 2 /lpf       ULTRASOUND:  Twin A:  Cephalic, maternal left  EFW 1223 gms (18%)  BPP 8/8    Twin B:  Breech, maternal right  EFW 1338 gms (36%)  BPP 8/8    Intertwin discordance 8%    ASSESSMENT:    32 y.o. female  at 34w10d with dichorionic/diamniotic twin pregnancy and  labor.     Taishianna's contractions have resolved with indomethacin for tocolysis. We discussed her risk for  delivery. We also discussed observation after we discontinue tocolysis to monitor for labor. If she has contractions after discontinuation of indomethacin, consider nifedipine for tocolysis. PLAN:     Complete  corticosteroid course   Continue indomethacin for tocolysis for 48 hours   Fetal status reassuring X 2   Monitor for signs or symptoms of labor   Observe for at least 24 hours after indomethacin has been discontinued. The patient visit was approximately 60 minutes with greater than half spent in face-to face counseling and coordination of care.     Rere Hilton MD  Maternal Fetal Medicine

## 2022-07-07 NOTE — PROGRESS NOTES
Progress Note    Pj Guy  [id number]  1995   29w4d      S:  Feeling increasing pain with contractions. Breathing through them and feeling they are getting stronger and more frequent. O:    Visit Vitals  /65 (BP 1 Location: Left upper arm, BP Patient Position: At rest)   Pulse (!) 111   Temp 98.2 °F (36.8 °C)   Resp 18   Ht 5' 3\" (1.6 m)   Wt 64.4 kg (142 lb)   SpO2 100%   BMI 25.15 kg/m²     Cervix 2/50/-3, soft      Patient Vitals for the past 4 hrs: Mode Fetal Heart Rate Variability Decelerations Accelerations RN Reviewed Strip?   22 US Readjusted -- -- -- -- --   22 External 130 6-25 BPM None Yes Yes     Patient Vitals for the past 4 hrs: Mode - Baby B Fetal Heart Rate - Baby B Variability - Baby B Decelerations - Baby B Accelerations - Baby B RN Reviewed Strip - Baby B?   22 US Readjusted -- -- -- -- --   22 1900 External 145 6-25 BPM None No Yes     [place] -  Irritability and irregular contractions q2-5 minutes     BSUS done and Twin A - vertex   Twin B - breech to transverse. A/P:  32 y.o.  @ 29w4d - threatened PTL    S/p BMTZ  On magnesium for tocolysis and neuroprotection. Will start indocin now. Received a fluid bolus. Sending GBS now. If continues to contract/labors will start GBS ppx. Continue close monitoring. Planning on  if labors.       Kristen Smith MD  [address] [place] [address] [facility]

## 2022-07-07 NOTE — PROGRESS NOTES
1900: Bedside and Verbal shift change report given to Sampson Jones (oncoming nurse) by Naomi Samaniego RN (offgoing nurse). Report included the following information SBAR, Kardex, Intake/Output, MAR and Recent Results. 2030Rahel Ware RN overseeing care of this pt from 2030 to 2330.     0700: Bedside and Verbal shift change report given to Naomi Lira RN and Cyndy Rodriguez RN (oncoming nurse) by Sampson Jones (offgoing nurse). Report included the following information SBAR, Kardex, Intake/Output, MAR and Recent Results.

## 2022-07-07 NOTE — PROGRESS NOTES
AntePartum Progress Note    Leonardo Law  22P4C    Patient admitted for  labor 29w5d Estimated Date of Delivery: 22     Able to sleep on and off last night but still tired this AM. Was feeling some cramping, none at this time. No bleeding, LOF. Good FM. Vitals:  Patient Vitals for the past 24 hrs:   BP Temp Pulse Resp SpO2 Height Weight   22 0730 (!) 101/59 97.9 °F (36.6 °C) 96 15 99 % -- --   22 0531 103/65 -- 89 14 100 % -- --   22 0431 (!) 100/57 -- 100 14 98 % -- --   22 0331 (!) 100/59 97.8 °F (36.6 °C) 98 16 99 % -- --   22 0232 (!) 100/56 -- 93 14 99 % -- --   22 0132 (!) 100/58 -- 95 -- -- -- --   22 0031 120/70 -- 100 14 99 % -- --   22 2330 113/69 -- 94 16 100 % -- --   22 2230 114/70 97.8 °F (36.6 °C) 96 15 100 % -- --   22 2130 109/69 -- 99 14 100 % -- --   22 2030 113/69 -- 96 15 100 % -- --   22 1930 108/68 98.4 °F (36.9 °C) (!) 105 14 100 % -- --   22 1832 101/65 -- (!) 111 18 100 % -- --   22 1730 105/69 -- (!) 105 17 100 % -- --   22 1701 111/65 -- 99 18 100 % -- --   22 1630 109/68 -- (!) 101 18 100 % -- --   22 1600 112/71 -- (!) 103 18 100 % -- --   22 1545 105/60 -- (!) 109 18 98 % -- --   22 1530 111/68 98.2 °F (36.8 °C) 97 17 100 % -- --   22 1423 112/72 98.6 °F (37 °C) (!) 104 17 100 % -- --   22 1355 -- -- -- -- -- 5' 3\" (1.6 m) 64.4 kg (142 lb)     Temp (24hrs), Av.1 °F (36.7 °C), Min:97.8 °F (36.6 °C), Max:98.6 °F (37 °C)    I&O:   701 - 1900  In: 125 [I.V.:125]  Out: 200 [Urine:200]              190 -  0700  In: 2279.5 [I.V.:2279.5]  Out: 8917 [Urine:3590]  NST:  Reactive  Uterine Activity: irritability    Exam:  Patient without distress.                Abdomen soft, non-tender               Fundus soft and non tender               Lower extremities edema No                           Cervix 50/-3  2200 Labs:   Recent Results (from the past 24 hour(s))   FETAL FIBRONECTIN    Collection Time: 22  1:37 PM   Result Value Ref Range    Fetal fibronectin Positive (A) NEG     URINALYSIS W/ REFLEX CULTURE    Collection Time: 22  6:35 PM    Specimen: Urine   Result Value Ref Range    Color YELLOW/STRAW      Appearance CLEAR CLEAR      Specific gravity 1.011 1.003 - 1.030      pH (UA) 7.0 5.0 - 8.0      Protein Negative NEG mg/dL    Glucose Negative NEG mg/dL    Ketone Negative NEG mg/dL    Bilirubin Negative NEG      Blood Negative NEG      Urobilinogen 1.0 0.2 - 1.0 EU/dL    Nitrites Negative NEG      Leukocyte Esterase Negative NEG      UA:UC IF INDICATED CULTURE NOT INDICATED BY UA RESULT CNI      WBC 0-4 0 - 4 /hpf    RBC 0-5 0 - 5 /hpf    Epithelial cells FEW FEW /lpf    Bacteria Negative NEG /hpf    Hyaline cast 0-2 0 - 2 /lpf       Assessment and Plan:   IUP @ 29w5d   Patient Active Problem List    Diagnosis Date Noted    Pregnancy 2022    Pregnant 2015     Threatened  labor: feeling better this AM, irritability, cervix stable at 2cm. - BMZ #2 at  today  - mag for 24hrs for neuroprotection (off at 1530)---can pause for MFM consultation if needed  - regular diet this AM  - MFM   - GBS culture pending  - cont acyclovir for HSV ppx  - continue indocin  - dispo: pending-anticipate monitoring for at least 24-48hrs without contractions.  Pt does live 20min away    Juan Krueger MD  [facility]

## 2022-07-07 NOTE — PROGRESS NOTES
1900: Bedside and Verbal shift change report given to PATRICK Crocker RN (oncoming nurse) by Damian Freeman. Basil Dunbar RN and GIANNI Perez RN (offgoing nurse). Report included the following information SBAR, Kardex, MAR and Recent Results. 2128: This RN at bedside to place patient on monitor for NST.    2215: Pt taken off EFM. Reactive reviewed with DAVID Ureña RN    1820: This RN at bedside to reassess patient. Pt resting comfortably with no complaints at this time. 0710: Bedside and Verbal shift change report given to COLLEEN Dunbar RN (oncoming nurse) by PATRICK Crocker RN (offgoing nurse). Report included the following information SBAR, Kardex, Intake/Output, MAR and Recent Results.

## 2022-07-07 NOTE — PROGRESS NOTES
2138 - Pt reports feeling more contraction pain. RN notifying Dr. Ioana Rice. VORB from Dr. Ioana Rice to administer 500 mL LR IVF bolus. RN starting bolus at this time. 2205 - RN administering acyclovir and indomethacin at this time. 2216 - Dr. Ioana Rice at pt bedside performing US at this time. Pt removed from [facility]. Baby A vertex; Baby B possibly transverse. 2223 - SVE by Dr. Ioana Rice (2/50/-3). 2234 - Pt back on EFM at this time.

## 2022-07-08 PROBLEM — O60.00 PRETERM LABOR: Status: ACTIVE | Noted: 2022-07-08

## 2022-07-08 PROCEDURE — G0378 HOSPITAL OBSERVATION PER HR: HCPCS

## 2022-07-08 PROCEDURE — 65270000029 HC RM PRIVATE

## 2022-07-08 PROCEDURE — 74011250637 HC RX REV CODE- 250/637: Performed by: OBSTETRICS & GYNECOLOGY

## 2022-07-08 PROCEDURE — 74011250637 HC RX REV CODE- 250/637: Performed by: STUDENT IN AN ORGANIZED HEALTH CARE EDUCATION/TRAINING PROGRAM

## 2022-07-08 RX ORDER — BUTALBITAL, ACETAMINOPHEN AND CAFFEINE 50; 325; 40 MG/1; MG/1; MG/1
2 TABLET ORAL ONCE
Status: COMPLETED | OUTPATIENT
Start: 2022-07-08 | End: 2022-07-08

## 2022-07-08 RX ORDER — ACETAMINOPHEN 500 MG
1000 TABLET ORAL
Status: DISCONTINUED | OUTPATIENT
Start: 2022-07-08 | End: 2022-07-10 | Stop reason: HOSPADM

## 2022-07-08 RX ORDER — ZOLPIDEM TARTRATE 5 MG/1
5 TABLET ORAL
Status: DISCONTINUED | OUTPATIENT
Start: 2022-07-08 | End: 2022-07-10 | Stop reason: HOSPADM

## 2022-07-08 RX ADMIN — BUTALBITAL, ACETAMINOPHEN, AND CAFFEINE 2 TABLET: 50; 325; 40 TABLET ORAL at 22:45

## 2022-07-08 RX ADMIN — ACYCLOVIR 400 MG: 800 TABLET ORAL at 22:46

## 2022-07-08 RX ADMIN — INDOMETHACIN 25 MG: 25 CAPSULE ORAL at 01:39

## 2022-07-08 RX ADMIN — INDOMETHACIN 25 MG: 25 CAPSULE ORAL at 09:44

## 2022-07-08 RX ADMIN — ACYCLOVIR 400 MG: 800 TABLET ORAL at 09:44

## 2022-07-08 RX ADMIN — BUTALBITAL, ACETAMINOPHEN, AND CAFFEINE 2 TABLET: 50; 325; 40 TABLET ORAL at 18:15

## 2022-07-08 RX ADMIN — INDOMETHACIN 25 MG: 25 CAPSULE ORAL at 22:46

## 2022-07-08 RX ADMIN — ACETAMINOPHEN 1000 MG: 500 TABLET ORAL at 14:34

## 2022-07-08 RX ADMIN — INDOMETHACIN 25 MG: 25 CAPSULE ORAL at 05:38

## 2022-07-08 RX ADMIN — ACETAMINOPHEN 1000 MG: 500 TABLET ORAL at 08:28

## 2022-07-08 RX ADMIN — INDOMETHACIN 25 MG: 25 CAPSULE ORAL at 13:35

## 2022-07-08 RX ADMIN — ACYCLOVIR 400 MG: 800 TABLET ORAL at 16:41

## 2022-07-08 RX ADMIN — INDOMETHACIN 25 MG: 25 CAPSULE ORAL at 18:05

## 2022-07-08 NOTE — PROGRESS NOTES
Patient assessed at bedside. Feels well overall, still has mild headache. Denies ctx, VB, LOF, endorses fetal movement. No current complaints, patient resting comfortably in bed.

## 2022-07-08 NOTE — PROGRESS NOTES
1810 This RN at PT bedside assisting COLLEEN Rodriguez with med admin. PT reporting a very intense headache that tylenol doesn't help. This RN leaving room to speak with Dr. Audrey Whitehead regarding meds for HA. MD says PT can have fiorcet (see MAR).

## 2022-07-08 NOTE — PROGRESS NOTES
0740 Assumed care of patient. Patient asleep in bed.    0825 Patient c/o H/A, rates 7/10 pain scale. States she had it since yesterday but tries not to take medication and toughed it out. Dr. Geoff Augustin notified, order for tylenol prn. Denies visual disturbances, N/V, RUQ pain. Voiding without difficulty. Denies vaginal bleeding or loss of fluid. 1711 Tylenol administered per prn orders. 0655 EFM applied for NST tracing, Baby A and Marco Antonio Augustin to bedside to discuss POC. 1005 Patient consuming breakfast at this time. 1010 NST tracing reviewed and verified with charge nurse Frederick Gunn, 1071 [facility] Bedside shift change report given to [address] (oncoming nurse) by [facility] [place] RN (offgoing nurse). Report included the following information SBAR, Kardex, MAR and Recent Results.

## 2022-07-08 NOTE — PROGRESS NOTES
AntePartum Progress Note    Asa Chin  80W3Z    Patient admitted for  labor in setting of di/di twin pregnancy 29w6d Estimated Date of Delivery: 22 states she does not  have  contractions, vaginal bleeding  and vaginal leaking of fluid . Reports headache this morning, received tylenol. Vitals:  Patient Vitals for the past 24 hrs:   BP Temp Pulse Resp SpO2   22 0753 (!) 98/55 98.5 °F (36.9 °C) 82 16 99 %   22 0536 (!) 100/53 98.1 °F (36.7 °C) 87 14 100 %   22 2054 (!) 105/56 98 °F (36.7 °C) 98 14 100 %   22 1629 114/61 -- -- -- --   22 1454 111/65 97.7 °F (36.5 °C) -- -- --   22 1430 104/61 -- 95 16 100 %   22 1331 (!) 102/59 -- 94 16 98 %   22 1130 102/61 -- 98 16 99 %   22 1030 105/63 -- 96 16 99 %   22 0930 105/67 -- 94 16 99 %     Temp (24hrs), Av.1 °F (36.7 °C), Min:97.7 °F (36.5 °C), Max:98.5 °F (36.9 °C)    I&O:   No intake/output data recorded.  1901 -  0700  In: 2670.3 [I.V.:2670.3]  Out: 4310 [Urine:4310]  NST:  Reactive  Uterine Activity: 2 contractions on toco in 1 hour    Exam:  Patient without distress. Abdomen soft, non-tender               Fundus soft and non tender               Lower extremities edema No                                           Labs: No results found for this or any previous visit (from the past 24 hour(s)). Assessment and Plan:   IUP @ 29w6d   Patient Active Problem List    Diagnosis Date Noted   [facility] Dichorionic diamniotic twin pregnancy in third trimester 2022     labor in third trimester without delivery 2022     28yo  at 29w6d presenting in  labor with di/di twin pregnancy   - s/p betamethasone x2 doses for fetal lung maturity  - fetal status reassuring  - continue indomethacin for tocolysis for 48 hours  - continue to monitor for signs and symptoms of labor  - s/p MFM consultation -- appreciate recommendations.

## 2022-07-09 PROCEDURE — 74011250636 HC RX REV CODE- 250/636: Performed by: OBSTETRICS & GYNECOLOGY

## 2022-07-09 PROCEDURE — 65270000029 HC RM PRIVATE

## 2022-07-09 PROCEDURE — 74011250637 HC RX REV CODE- 250/637: Performed by: STUDENT IN AN ORGANIZED HEALTH CARE EDUCATION/TRAINING PROGRAM

## 2022-07-09 PROCEDURE — 74011250637 HC RX REV CODE- 250/637: Performed by: OBSTETRICS & GYNECOLOGY

## 2022-07-09 RX ORDER — OXYCODONE HYDROCHLORIDE 5 MG/1
5 TABLET ORAL
Status: DISCONTINUED | OUTPATIENT
Start: 2022-07-09 | End: 2022-07-10 | Stop reason: HOSPADM

## 2022-07-09 RX ORDER — GUAIFENESIN 600 MG/1
600 TABLET, EXTENDED RELEASE ORAL EVERY 12 HOURS
Status: DISCONTINUED | OUTPATIENT
Start: 2022-07-09 | End: 2022-07-10 | Stop reason: HOSPADM

## 2022-07-09 RX ORDER — PROCHLORPERAZINE EDISYLATE 5 MG/ML
10 INJECTION INTRAMUSCULAR; INTRAVENOUS
Status: DISCONTINUED | OUTPATIENT
Start: 2022-07-09 | End: 2022-07-10 | Stop reason: HOSPADM

## 2022-07-09 RX ADMIN — ACYCLOVIR 400 MG: 800 TABLET ORAL at 08:10

## 2022-07-09 RX ADMIN — ACYCLOVIR 400 MG: 800 TABLET ORAL at 17:11

## 2022-07-09 RX ADMIN — ACETAMINOPHEN 1000 MG: 500 TABLET ORAL at 18:22

## 2022-07-09 RX ADMIN — ACETAMINOPHEN 1000 MG: 500 TABLET ORAL at 12:06

## 2022-07-09 RX ADMIN — ACYCLOVIR 400 MG: 800 TABLET ORAL at 22:17

## 2022-07-09 RX ADMIN — GUAIFENESIN 600 MG: 600 TABLET ORAL at 17:17

## 2022-07-09 RX ADMIN — PROCHLORPERAZINE EDISYLATE 10 MG: 5 INJECTION INTRAMUSCULAR; INTRAVENOUS at 20:35

## 2022-07-09 NOTE — PROGRESS NOTES
1215 Pt c/o headache 4/10. Pt stated she began to feel pressure in her sinus area that began this morning and now is feeling pain and pressure. Tylenol 1000mg given. Pt denies any other complaints. 12 Pt place on monitor for NST.    1400 Difficulty juany baby B, efm adjusted multiple times. 1600 Pt verbalized she is more aware of her ctx today than yesterday. Pt stated \" I feel them but it's not painful\". 56 Dr Hazel Malloy updated on fetal heart tracing. Aware baby A had two variables at 1351 and 1421 with none since. Aware pt is having irregular ctx that are not painful but she is more aware of them today than yesterday. [address] [facility] [address] with Dr Hazel Malloy on the phone. Orders received for the pt to stay until tomorrow for continued observation. EFM's to come off with continuous toco to monitor ctx.     1655 Dr Dodson at bedside to introduce himself to pt. Pt expresses no needs at this time. 1700 Pt c/o sinus pressure and is requesting medication. Will notify Dr Dodson. 1717 Pt given Mucinex 600mg to treat sinus pressure. Orders received per Hien Ricketts.     Shift report given to Nereida Warren RN

## 2022-07-09 NOTE — PROGRESS NOTES
0700:Bedside and Verbal shift change report given to S. Corlis Hatchet RN (oncoming nurse) by Faiza Lundberg Sa, RN (offgoing nurse). Report included the following information SBAR, Kardex, Procedure Summary, Intake/Output, MAR, Recent Results, Med Rec Status and Quality Measures. 4390: scheduled medication given. VSS. Linens changed. 0825: Pt placed on EFM/TOCO for NST at this time. Pt states that contractions are very mild, intermittent, andnot causing pt discomfort. pt endorses positive FM. Pt denies HA at this time. 0920: Dr. Kevon Mcdonald fetal tracing, per MD- NST reactive. Per MD [place]- place pt back on EMF/TOCO for NST between 3694-9991, if fetal tracing reactive and contractions have not worsened, pt may be discharged home today. 1045: SBAR given to COLLEEN Moore RN at this time.

## 2022-07-09 NOTE — PROGRESS NOTES
1900 SBAR report received from off going rn COLLEEN Sim    2020 Pt reports still having a headache after taking tylenol and mucinex and unable to focus on ctx pattern. Requesting more medication. Dr Brit Bautista made aware and ordered to give Compazine 10mg IV for headache. 2049 Pt placed on EFM for NST. 2130 Pt reports less pain with headache at this time. Rates pain 4/10 and will attempt to get some sleep. Pt reports ctx are less intense at this time than they were during the day. [id number] this RN at bedside staying at bedside to continuously trace FHR on baby A & B and audibly heard [facility]    0515 Pt reports only a dull headache at this time and denies need for medication. Will notify rn if any changes    SBAR report given to oncoming amy Valdez

## 2022-07-09 NOTE — PROGRESS NOTES
High Risk Obstetrics Progress Note    Name: Ludwin Corcoran MRN: [id number]  SSN: [id number]    YOB: 1995  Age: 32 y.o. Sex: female      Subjective:      LOS: 1 day    Estimated Date of Delivery: 22   Gestational Age Today: 26w0d     Patient admitted for  labor and twins. States she does have normal fetal movement and does not have vaginal bleeding  and vaginal leaking of fluid . Reports occ tightening. Objective:     Vitals:  Blood pressure 113/69, pulse 81, temperature 98.2 °F (36.8 °C), resp. rate 16, height 5' 3\" (1.6 m), weight 64.4 kg (142 lb), SpO2 98 %, unknown if currently breastfeeding. Temp (24hrs), Av.3 °F (36.8 °C), Min:98.2 °F (36.8 °C), Max:98.4 °F (28.3 °C)    Systolic (06EYH), ZLF:993 , Min:102 , AYY:485      Diastolic (39ABI), HBP:91, Min:52, Max:69       Intake and Output:         Physical Exam:  Patient without distress. Abd: gravid, NT  Lower Extremities:  - No cords or calf tenderness. Membranes:  Intact    Uterine Activity:  Irregular, pt not feeling most of them    Fetal Heart Rate:  reasuring x 2        Labs: No results found for this or any previous visit (from the past 36 hour(s)). Assessment and Plan:Twin IUP at 30 weeks with PTL and s/p steroids, Magnesium and indocin. Will monitor today off of meds and if stable discharge to home later. Principal Problem:     labor in third trimester without delivery (2022)    Active Problems:    Dichorionic diamniotic twin pregnancy in third trimester (2022)       labor (2022)        Labor:  discharge to home later todayif stable   Twin Pregnancy:  as above.

## 2022-07-09 NOTE — PROGRESS NOTES
1900: Bedside and Verbal shift change report given to Eboni Carlos RN (oncoming nurse) by Asa Medina RN (offgoing nurse). Report included the following information SBAR, Kardex, Intake/Output, MAR and Recent Results. 2003: [place] and US placed on pt for NST. This RN remained at bedside during fetal tracing. 2044: EFM turned off.    2045: Dr Ashutosh Dao reviewing NST and verified as reactive at this time. 2239: This RN spoke with Dr Ashutosh Dao in regards to pt c/o headache 9/10 pain. TORB for ambien 5 mg and Fioricet 2 tablets to be given now and to hold the last dose of indomethacin. 2245: Pt declines Ambien stating she has no problem with sleeping. 0005: RN at bedside to assess pt condition. HA is still the same. 0009: This RN spoke with Dr Ashutosh Dao about pt condition. Orders received for oxycodone 5 mg. 0010: Oxycodone offered to pt. Pt declines oxycodone at this time stating HA may be getting better. 0700: Bedside and Verbal shift change report given to Jose Santillan RN (oncoming nurse) by Eboni Carlos RN (offgoing nurse). Report included the following information SBAR, Kardex, Intake/Output, MAR and Recent Results.

## 2022-07-10 VITALS
WEIGHT: 142 LBS | OXYGEN SATURATION: 99 % | BODY MASS INDEX: 25.16 KG/M2 | RESPIRATION RATE: 16 BRPM | TEMPERATURE: 98.1 F | DIASTOLIC BLOOD PRESSURE: 56 MMHG | SYSTOLIC BLOOD PRESSURE: 105 MMHG | HEART RATE: 93 BPM | HEIGHT: 63 IN

## 2022-07-10 LAB
BACTERIA SPEC CULT: NORMAL
SERVICE CMNT-IMP: NORMAL

## 2022-07-10 PROCEDURE — 74011250637 HC RX REV CODE- 250/637: Performed by: STUDENT IN AN ORGANIZED HEALTH CARE EDUCATION/TRAINING PROGRAM

## 2022-07-10 PROCEDURE — 74011250636 HC RX REV CODE- 250/636: Performed by: OBSTETRICS & GYNECOLOGY

## 2022-07-10 PROCEDURE — 74011250637 HC RX REV CODE- 250/637: Performed by: OBSTETRICS & GYNECOLOGY

## 2022-07-10 RX ADMIN — ACETAMINOPHEN 1000 MG: 500 TABLET ORAL at 07:40

## 2022-07-10 RX ADMIN — GUAIFENESIN 600 MG: 600 TABLET ORAL at 08:37

## 2022-07-10 RX ADMIN — PROCHLORPERAZINE EDISYLATE 10 MG: 5 INJECTION INTRAMUSCULAR; INTRAVENOUS at 07:37

## 2022-07-10 NOTE — PROGRESS NOTES
7776: RN at bedside. Pt reporting increase in headache pain. Rates headache 7/10. Pt describes headache as pressure consistent with a sinus headache she experiences periodically. Pt states the compazine she had last night did help with her headache and would like to try that again. 0740: Medications given. See MAR. Cold ice pack applied to pt's forehead for comfort. Lights off. Advised pt will return to complete NST.     0931: Pt denies feeling any contraction pain and is ready to be discharge home. Written discharge instructions reviewed with patient. Pt has follow up appointment with OB on Wednesday 7/13 that she will keep. Pt advised to notify md if contractions become more frequent or more painful. PTL precautions reviewed. Pt denies any additional questions or concerns. 4296: Pt left unit ambulatory in no signs of distress.

## 2022-07-10 NOTE — DISCHARGE SUMMARY
Antepartum Discharge Summary     Name: Rachel Nicolas MRN: [id number]  SSN: [id number]    YOB: 1995  Age: 32 y.o. Sex: female      Allergies: Patient has no known allergies. Admit Date: 2022    Discharge Date: 7/10/2022    Admitting Physician: Karla Valverde MD    Attending Physician:  Karla Valverde    * Admission Diagnoses: Pregnancy [Z34.90]   labor [O60.00]    * Discharge Diagnoses:   Hospital Problems as of 7/10/2022 Never Reviewed            Codes Class Noted - Resolved POA     labor ICD-10-CM: O60.00  ICD-9-CM: 644.00  2022 - Present Unknown        Dichorionic diamniotic twin pregnancy in third trimester ICD-10-CM: O30.043  ICD-9-CM: 651.03, V91.03  2022 - Present Yes        * (Principal)  labor in third trimester without delivery ICD-10-CM: O60.03  ICD-9-CM: 644.03  2022 - Present Yes        RESOLVED: Pregnancy ICD-10-CM: Z34.90  ICD-9-CM: V22.2  2022 - 2022 Unknown               Lab Results   Component Value Date/Time    Rubella, External Immune  2015 12:00 AM    GrBStrep, External Positive  2015 12:00 AM    ABO,Rh O Positive  2015 12:00 AM    There is no immunization history for the selected administration types on file for this patient. * Discharge Condition: good    * Procedures: none  * No surgery found [facility] Course:    -  Labor:                            - Patient received 48 hour course of steroids as well as magnesium sulfate and indocin.     - Patient successfully tocolyzed. * Disposition: Home    Discharge Medications:   Current Discharge Medication List        CONTINUE these medications which have NOT CHANGED    Details   pnv w/o calcium-iron fum-fa 27-1 mg tab Take 1 Tab by mouth daily. cetirizine (ZYRTEC) 10 mg tablet Take 1 Tab by mouth daily. Qty: 20 Tab, Refills: 0      triamcinolone (NASACORT) 55 mcg nasal inhaler 2 Sprays by Both Nostrils route daily.   Qty: 1 Bottle, Refills: 0      acyclovir (ZOVIRAX) 800 mg tablet Take 800 mg by mouth two (2) times a day. STOP taking these medications       ibuprofen (MOTRIN) 800 mg tablet Comments:   Reason for Stopping:               * Follow-up Care/Patient Instructions:   Activity: light activity and pelvic rest  Diet: Regular Diet  Wound Care: None needed    RTO as scheduled or prn    Follow-up Information       Follow up With Specialties Details Why Contact Info    Nikia Villareal, [address] [address] [phone]

## 2022-07-10 NOTE — DISCHARGE INSTRUCTIONS
Patient Education   Patient Education   Patient Education        Counting Your Baby's Kicks: Care Instructions  Overview     Counting your baby's kicks is one way your doctor can tell that your baby is healthy. Most women--especially in a first pregnancy--feel their baby move for the first time between 16 and 22 weeks. The movement may feel like flutters rather than kicks. Your baby may move more at certain times of the day. When you are active, you may notice less kicking than when you are resting. At your prenatal visits, your doctor will ask whether the baby is active. In your last trimester, your doctor may ask you to count the number of times you feel your baby move. Follow-up care is a key part of your treatment and safety. Be sure to make and go to all appointments, and call your doctor if you are having problems. It's also a good idea to know your test results and keep a list of the medicines you take. How do you count fetal kicks? · A common method of checking your baby's movement is to note the length of time it takes to count ten movements (such as kicks, flutters, or rolls). · Pick your baby's most active time of day to count. This may be any time from morning to evening. · If you don't feel 10 movements in an hour, have something to eat or drink and count for another hour. If you don't feel at least 10 movements in the 2-hour period, call your doctor. When should you call for help? Call your doctor now or seek immediate medical care if:    · You noticed that your baby has stopped moving or is moving much less than normal.   Watch closely for changes in your health, and be sure to contact your doctor if you have any problems. Where can you learn more? Go to http://www.gray.com/  Enter [id number] in the search box to learn more about \"Counting Your Baby's Kicks: Care Instructions. \"  Current as of: June 16, 2021               Content Version: 13.2  © 0433-1599 [employer]. Care instructions adapted under license by [employer] (which disclaims liability or warranty for this information). If you have questions about a medical condition or this instruction, always ask your healthcare professional. [address] any warranty or liability for your use of this information. Pregnancy Precautions: Care Instructions  Your Care Instructions     There is no sure way to prevent labor before your due date ( labor) or to prevent most other pregnancy problems. But there are things you can do to increase your chances of a healthy pregnancy. Go to your appointments, follow your doctor's advice, and take good care of yourself. Eat well, and exercise (if your doctor agrees). And make sure to drink plenty of water. Follow-up care is a key part of your treatment and safety. Be sure to make and go to all appointments, and call your doctor if you are having problems. It's also a good idea to know your test results and keep a list of the medicines you take. How can you care for yourself at home? · Make sure you go to your prenatal appointments. At each visit, your doctor will check your blood pressure. Your doctor will also check to see if you have protein in your urine. High blood pressure and protein in urine are signs of preeclampsia. This condition can be dangerous for you and your baby. · Drink plenty of fluids. Dehydration can cause contractions. If you have kidney, heart, or liver disease and have to limit fluids, talk with your doctor before you increase the amount of fluids you drink. · Tell your doctor right away if you notice any symptoms of an infection, such as:  ? Burning when you urinate. ? A foul-smelling discharge from your vagina. ? Vaginal itching. ? Unexplained fever. ? Unusual pain or soreness in your uterus or lower belly. · Eat a balanced diet. Include plenty of foods that are high in calcium and iron. ?  Foods high in calcium include milk, cheese, yogurt, almonds, and broccoli. ? Foods high in iron include red meat, shellfish, poultry, eggs, beans, raisins, whole-grain bread, and leafy green vegetables. · Do not smoke. If you need help quitting, talk to your doctor about stop-smoking programs and medicines. These can increase your chances of quitting for good. · Do not drink alcohol or use marijuana or illegal drugs. · Follow your doctor's directions about activity. Your doctor will let you know how much, if any, exercise you can do. · Ask your doctor if you can have sex. If you are at risk for early labor, your doctor may ask you to not have sex. · Take care to prevent falls. During pregnancy, your joints are loose, and your balance is off. Sports such as bicycling, skiing, or in-line skating can increase your risk of falling. And don't ride horses or motorcycles, dive, water ski, scuba dive, or parachute jump while you are pregnant. · Avoid things that can make your body too hot and may be harmful to your baby, such as a hot tub or sauna. Or talk with your doctor before doing anything that raises your body temperature. Your doctor can tell you if it's safe. · Do not take any over-the-counter or herbal medicines or supplements without talking to your doctor or pharmacist first.  When should you call for help? Call 911  anytime you think you may need emergency care. For example, call if:    · You passed out (lost consciousness).     · You have a seizure.     · You have severe vaginal bleeding.     · You have severe pain in your belly or pelvis.     · You have had fluid gushing or leaking from your vagina and you know or think the umbilical cord is bulging into your vagina. If this happens, immediately get down on your knees so your rear end (buttocks) is higher than your head. This will decrease the pressure on the cord until help arrives.    Call your doctor now or seek immediate medical care if:    · You have signs of preeclampsia, such as:  ? Sudden swelling of your face, hands, or feet. ? New vision problems (such as dimness, blurring, or seeing spots). ? A severe headache.     · You have any vaginal bleeding.     · You have belly pain or cramping.     · You have a fever.     · You have had regular contractions (with or without pain) for an hour. This means that you have 8 or more within 1 hour or 4 or more in 20 minutes after you change your position and drink fluids.     · You have a sudden release of fluid from your vagina.     · You have low back pain or pelvic pressure that does not go away.     · You notice that your baby has stopped moving or is moving much less than normal.   Watch closely for changes in your health, and be sure to contact your doctor if you have any problems. Where can you learn more? Go to http://www.ramirez.com/  Enter Y951 in the search box to learn more about \"Pregnancy Precautions: Care Instructions. \"  Current as of: June 16, 2021               Content Version: 13.2  © 2006-2022 [employer]. Care instructions adapted under license by [employer] (which disclaims liability or warranty for this information). If you have questions about a medical condition or this instruction, always ask your healthcare professional. [address] any warranty or liability for your use of this information. Nima Goldsmith Contractions: Care Instructions  Your Care Instructions     [place] contractions prepare your uterus for labor. Think of them as a \"warm-up\" exercise that your body does. You may begin to feel them between the 28th and 30th weeks of your pregnancy. But they start as early as the 20th week. [place] contractions usually occur more often during the ninth month.  They may go away when you are active and return when you rest. These contractions are like mild contractions of true labor, but they occur less often. (You feel fewer than 8 in an hour.) They don't cause your cervix to open. It may be hard for you to tell the difference between [facility] contractions and true labor, especially in your first pregnancy. Follow-up care is a key part of your treatment and safety. Be sure to make and go to all appointments, and call your doctor if you are having problems. It's also a good idea to know your test results and keep a list of the medicines you take. How can you care for yourself at home? · Try a warm bath to help relieve muscle tension and reduce pain. · Change positions every 30 minutes. Take breaks if you must sit for a long time. Get up and walk around. · Drink plenty of water. · Taking short walks may help you feel better. Your doctor needs to check any contractions that are getting stronger or closer together. Where can you learn more? Go to http://www.gray.com/  Enter Z402 in the search box to learn more about \"[place] Contractions: Care Instructions. \"  Current as of: June 16, 2021               Content Version: 13.2  © 7521-7999 Healthwise[employer] Incorporated. Care instructions adapted under license by [employer] (which disclaims liability or warranty for this information). If you have questions about a medical condition or this instruction, always ask your healthcare professional. [address] any warranty or liability for your use of this information.

## 2022-07-10 NOTE — PROGRESS NOTES
High Risk Obstetrics Progress Note    Name: Miguel Rodriguez MRN: [id number]  SSN: [id number]    YOB: 1995  Age: 32 y.o. Sex: female      Subjective:      LOS: 2 days    Estimated Date of Delivery: 22   Gestational Age Today: 29w1d     Patient admitted for  labor and twins. States she does have normal fetal movement and does not have vaginal bleeding  and vaginal leaking of fluid . Reports sinus congestion, intermittent HA. Also reports back pain and occ contractions. Objective:     Vitals:  Blood pressure (!) 105/56, pulse 93, temperature 98.1 °F (36.7 °C), resp. rate 16, height 5' 3\" (1.6 m), weight 64.4 kg (142 lb), SpO2 99 %, unknown if currently breastfeeding. Temp (24hrs), Av.2 °F (36.8 °C), Min:97.9 °F (36.6 °C), Max:98.4 °F (17.0 °C)    Systolic (77KOY), IMZ:814 , Min:90 , MBQ:180      Diastolic (29CGT), YF, Min:54, Max:59       Intake and Output:         Physical Exam:  Patient without distress. Abdomen: soft, nontender  Fundus: soft and non tender  Lower Extremities:  - Edema No   - No cords or calf tenderness. Membranes:  Intact    Uterine Activity:  None reported recently    Fetal Heart Rate:  Has been reassuring        Labs: No results found for this or any previous visit (from the past 36 hour(s)). Assessment and Plan: Twin IUP at 30 2/7 weeks s/p PTL, now stable. Will monitor babies this am and then discarge to home      Principal Problem:     labor in third trimester without delivery (2022)    Active Problems:    Dichorionic diamniotic twin pregnancy in third trimester (2022)       labor (2022)       twin IUP with PTL, stable.

## 2022-08-10 ENCOUNTER — HOSPITAL ENCOUNTER (OUTPATIENT)
Dept: PERINATAL CARE | Age: 27
Discharge: HOME | End: 2022-08-10
Attending: OBSTETRICS & GYNECOLOGY
Payer: COMMERCIAL

## 2022-08-10 PROCEDURE — 76816 OB US FOLLOW-UP PER FETUS: CPT | Performed by: OBSTETRICS & GYNECOLOGY

## 2022-08-10 PROCEDURE — 76819 FETAL BIOPHYS PROFIL W/O NST: CPT | Performed by: OBSTETRICS & GYNECOLOGY

## 2022-08-10 PROCEDURE — 76820 UMBILICAL ARTERY ECHO: CPT | Performed by: OBSTETRICS & GYNECOLOGY

## 2022-08-17 ENCOUNTER — HOSPITAL ENCOUNTER (OUTPATIENT)
Dept: PERINATAL CARE | Age: 27
Discharge: HOME | End: 2022-08-17
Attending: OBSTETRICS & GYNECOLOGY
Payer: COMMERCIAL

## 2022-08-17 PROCEDURE — 76819 FETAL BIOPHYS PROFIL W/O NST: CPT | Performed by: OBSTETRICS & GYNECOLOGY

## 2022-08-17 RX ORDER — VALACYCLOVIR HYDROCHLORIDE 500 MG/1
TABLET, FILM COATED ORAL
COMMUNITY
Start: 2022-08-08 | End: 2022-08-25

## 2022-08-17 RX ORDER — PYRIDOXINE HCL (VITAMIN B6) 100 MG
100 TABLET ORAL DAILY
COMMUNITY
End: 2022-08-25

## 2022-08-17 RX ORDER — LANOLIN ALCOHOL/MO/W.PET/CERES
325 CREAM (GRAM) TOPICAL DAILY
COMMUNITY
Start: 2022-07-06

## 2022-08-23 ENCOUNTER — HOSPITAL ENCOUNTER (INPATIENT)
Age: 27
LOS: 2 days | Discharge: HOME | End: 2022-08-25
Attending: OBSTETRICS & GYNECOLOGY | Admitting: OBSTETRICS & GYNECOLOGY
Payer: COMMERCIAL

## 2022-08-23 PROBLEM — Z34.90 PREGNANCY: Status: ACTIVE | Noted: 2022-08-23

## 2022-08-23 LAB
ABO + RH BLD: NORMAL
ALBUMIN SERPL-MCNC: 2.7 G/DL (ref 3.5–5)
ALBUMIN/GLOB SERPL: 0.7 {RATIO} (ref 1.1–2.2)
ALP SERPL-CCNC: 204 U/L (ref 45–117)
ALT SERPL-CCNC: 13 U/L (ref 12–78)
ANION GAP SERPL CALC-SCNC: 9 MMOL/L (ref 5–15)
AST SERPL-CCNC: 40 U/L (ref 15–37)
BASOPHILS # BLD: 0 K/UL (ref 0–0.1)
BASOPHILS NFR BLD: 0 % (ref 0–1)
BILIRUB SERPL-MCNC: 0.4 MG/DL (ref 0.2–1)
BLOOD GROUP ANTIBODIES SERPL: NORMAL
BUN SERPL-MCNC: 9 MG/DL (ref 6–20)
BUN/CREAT SERPL: 12 (ref 12–20)
CALCIUM SERPL-MCNC: 9.3 MG/DL (ref 8.5–10.1)
CHLORIDE SERPL-SCNC: 110 MMOL/L (ref 97–108)
CO2 SERPL-SCNC: 21 MMOL/L (ref 21–32)
CREAT SERPL-MCNC: 0.74 MG/DL (ref 0.55–1.02)
DIFFERENTIAL METHOD BLD: ABNORMAL
EOSINOPHIL # BLD: 0 K/UL (ref 0–0.4)
EOSINOPHIL NFR BLD: 0 % (ref 0–7)
ERYTHROCYTE [DISTWIDTH] IN BLOOD BY AUTOMATED COUNT: 21.4 % (ref 11.5–14.5)
GLOBULIN SER CALC-MCNC: 3.7 G/DL (ref 2–4)
GLUCOSE SERPL-MCNC: 66 MG/DL (ref 65–100)
HCT VFR BLD AUTO: 33.9 % (ref 35–47)
HGB BLD-MCNC: 10.6 G/DL (ref 11.5–16)
IMM GRANULOCYTES # BLD AUTO: 0.2 K/UL (ref 0–0.04)
IMM GRANULOCYTES NFR BLD AUTO: 2 % (ref 0–0.5)
LYMPHOCYTES # BLD: 1.4 K/UL (ref 0.8–3.5)
LYMPHOCYTES NFR BLD: 15 % (ref 12–49)
MCH RBC QN AUTO: 26.3 PG (ref 26–34)
MCHC RBC AUTO-ENTMCNC: 31.3 G/DL (ref 30–36.5)
MCV RBC AUTO: 84.1 FL (ref 80–99)
MONOCYTES # BLD: 0.8 K/UL (ref 0–1)
MONOCYTES NFR BLD: 9 % (ref 5–13)
NEUTS SEG # BLD: 6.9 K/UL (ref 1.8–8)
NEUTS SEG NFR BLD: 74 % (ref 32–75)
NRBC # BLD: 0 K/UL (ref 0–0.01)
NRBC BLD-RTO: 0 PER 100 WBC
PLATELET # BLD AUTO: 216 K/UL (ref 150–400)
PMV BLD AUTO: 12.1 FL (ref 8.9–12.9)
POTASSIUM SERPL-SCNC: 4.8 MMOL/L (ref 3.5–5.1)
PROT SERPL-MCNC: 6.4 G/DL (ref 6.4–8.2)
RBC # BLD AUTO: 4.03 M/UL (ref 3.8–5.2)
RBC MORPH BLD: ABNORMAL
SODIUM SERPL-SCNC: 140 MMOL/L (ref 136–145)
SPECIMEN EXP DATE BLD: NORMAL
WBC # BLD AUTO: 9.3 K/UL (ref 3.6–11)

## 2022-08-23 PROCEDURE — 65270000029 HC RM PRIVATE

## 2022-08-23 PROCEDURE — 74011250636 HC RX REV CODE- 250/636: Performed by: OBSTETRICS & GYNECOLOGY

## 2022-08-23 PROCEDURE — 75410000001 HC DELIVERY VAGINAL/MULTIPLE: Performed by: OBSTETRICS & GYNECOLOGY

## 2022-08-23 PROCEDURE — 87081 CULTURE SCREEN ONLY: CPT

## 2022-08-23 PROCEDURE — 75410000002 HC LABOR FEE PER 1 HR: Performed by: OBSTETRICS & GYNECOLOGY

## 2022-08-23 PROCEDURE — 51701 INSERT BLADDER CATHETER: CPT

## 2022-08-23 PROCEDURE — 88307 TISSUE EXAM BY PATHOLOGIST: CPT

## 2022-08-23 PROCEDURE — 87147 CULTURE TYPE IMMUNOLOGIC: CPT

## 2022-08-23 PROCEDURE — 75810000275 HC EMERGENCY DEPT VISIT NO LEVEL OF CARE

## 2022-08-23 PROCEDURE — 80053 COMPREHEN METABOLIC PANEL: CPT

## 2022-08-23 PROCEDURE — 76815 OB US LIMITED FETUS(S): CPT | Performed by: OBSTETRICS & GYNECOLOGY

## 2022-08-23 PROCEDURE — 74011250637 HC RX REV CODE- 250/637: Performed by: OBSTETRICS & GYNECOLOGY

## 2022-08-23 PROCEDURE — 85025 COMPLETE CBC W/AUTO DIFF WBC: CPT

## 2022-08-23 PROCEDURE — 36415 COLL VENOUS BLD VENIPUNCTURE: CPT

## 2022-08-23 PROCEDURE — 10907ZC DRAINAGE OF AMNIOTIC FLUID, THERAPEUTIC FROM PRODUCTS OF CONCEPTION, VIA NATURAL OR ARTIFICIAL OPENING: ICD-10-PCS | Performed by: OBSTETRICS & GYNECOLOGY

## 2022-08-23 PROCEDURE — 86900 BLOOD TYPING SEROLOGIC ABO: CPT

## 2022-08-23 PROCEDURE — 74011000250 HC RX REV CODE- 250: Performed by: OBSTETRICS & GYNECOLOGY

## 2022-08-23 PROCEDURE — 77010026065 HC OXYGEN MINIMUM MEDICAL AIR: Performed by: OBSTETRICS & GYNECOLOGY

## 2022-08-23 RX ORDER — OXYTOCIN/RINGER'S LACTATE 30/500 ML
10 PLASTIC BAG, INJECTION (ML) INTRAVENOUS AS NEEDED
Status: DISCONTINUED | OUTPATIENT
Start: 2022-08-23 | End: 2022-08-25 | Stop reason: HOSPADM

## 2022-08-23 RX ORDER — DIPHENHYDRAMINE HCL 25 MG
25 CAPSULE ORAL
Status: DISCONTINUED | OUTPATIENT
Start: 2022-08-23 | End: 2022-08-25 | Stop reason: HOSPADM

## 2022-08-23 RX ORDER — ZOLPIDEM TARTRATE 5 MG/1
5 TABLET ORAL
Status: DISCONTINUED | OUTPATIENT
Start: 2022-08-23 | End: 2022-08-25 | Stop reason: HOSPADM

## 2022-08-23 RX ORDER — TRANEXAMIC ACID 100 MG/ML
1000 INJECTION, SOLUTION INTRAVENOUS ONCE
Status: COMPLETED | OUTPATIENT
Start: 2022-08-23 | End: 2022-08-23

## 2022-08-23 RX ORDER — OXYTOCIN/RINGER'S LACTATE 30/500 ML
87.3 PLASTIC BAG, INJECTION (ML) INTRAVENOUS AS NEEDED
Status: DISCONTINUED | OUTPATIENT
Start: 2022-08-23 | End: 2022-08-25 | Stop reason: HOSPADM

## 2022-08-23 RX ORDER — HYDROCODONE BITARTRATE AND ACETAMINOPHEN 5; 325 MG/1; MG/1
1 TABLET ORAL
Status: DISCONTINUED | OUTPATIENT
Start: 2022-08-23 | End: 2022-08-25 | Stop reason: HOSPADM

## 2022-08-23 RX ORDER — LIDOCAINE HYDROCHLORIDE 10 MG/ML
INJECTION INFILTRATION; PERINEURAL
Status: DISPENSED
Start: 2022-08-23 | End: 2022-08-24

## 2022-08-23 RX ORDER — PENICILLIN G POTASSIUM 5000000 [IU]/1
INJECTION, POWDER, FOR SOLUTION INTRAMUSCULAR; INTRAVENOUS
Status: DISPENSED
Start: 2022-08-23 | End: 2022-08-24

## 2022-08-23 RX ORDER — NALOXONE HYDROCHLORIDE 0.4 MG/ML
0.4 INJECTION, SOLUTION INTRAMUSCULAR; INTRAVENOUS; SUBCUTANEOUS AS NEEDED
Status: DISCONTINUED | OUTPATIENT
Start: 2022-08-23 | End: 2022-08-25 | Stop reason: HOSPADM

## 2022-08-23 RX ORDER — ONDANSETRON 4 MG/1
4 TABLET, ORALLY DISINTEGRATING ORAL
Status: ACTIVE | OUTPATIENT
Start: 2022-08-23 | End: 2022-08-24

## 2022-08-23 RX ORDER — NALOXONE HYDROCHLORIDE 0.4 MG/ML
0.4 INJECTION, SOLUTION INTRAMUSCULAR; INTRAVENOUS; SUBCUTANEOUS AS NEEDED
Status: DISCONTINUED | OUTPATIENT
Start: 2022-08-23 | End: 2022-08-24

## 2022-08-23 RX ORDER — SODIUM CHLORIDE 0.9 % (FLUSH) 0.9 %
5-40 SYRINGE (ML) INJECTION EVERY 8 HOURS
Status: DISCONTINUED | OUTPATIENT
Start: 2022-08-23 | End: 2022-08-24

## 2022-08-23 RX ORDER — MORPHINE SULFATE 4 MG/ML
4 INJECTION INTRAVENOUS ONCE
Status: COMPLETED | OUTPATIENT
Start: 2022-08-23 | End: 2022-08-23

## 2022-08-23 RX ORDER — SODIUM CHLORIDE 0.9 % (FLUSH) 0.9 %
5-40 SYRINGE (ML) INJECTION AS NEEDED
Status: DISCONTINUED | OUTPATIENT
Start: 2022-08-23 | End: 2022-08-25 | Stop reason: HOSPADM

## 2022-08-23 RX ORDER — SODIUM CHLORIDE, SODIUM LACTATE, POTASSIUM CHLORIDE, CALCIUM CHLORIDE 600; 310; 30; 20 MG/100ML; MG/100ML; MG/100ML; MG/100ML
125 INJECTION, SOLUTION INTRAVENOUS CONTINUOUS
Status: DISCONTINUED | OUTPATIENT
Start: 2022-08-23 | End: 2022-08-24

## 2022-08-23 RX ORDER — HYDROCODONE BITARTRATE AND ACETAMINOPHEN 5; 325 MG/1; MG/1
2 TABLET ORAL
Status: DISCONTINUED | OUTPATIENT
Start: 2022-08-23 | End: 2022-08-24

## 2022-08-23 RX ORDER — PROCHLORPERAZINE EDISYLATE 5 MG/ML
5 INJECTION INTRAMUSCULAR; INTRAVENOUS
Status: DISCONTINUED | OUTPATIENT
Start: 2022-08-23 | End: 2022-08-23

## 2022-08-23 RX ORDER — HYDROCORTISONE ACETATE PRAMOXINE HCL 2.5; 1 G/100G; G/100G
CREAM TOPICAL AS NEEDED
Status: DISCONTINUED | OUTPATIENT
Start: 2022-08-23 | End: 2022-08-23

## 2022-08-23 RX ORDER — TRANEXAMIC ACID 100 MG/ML
INJECTION, SOLUTION INTRAVENOUS
Status: DISPENSED
Start: 2022-08-23 | End: 2022-08-24

## 2022-08-23 RX ORDER — SODIUM CHLORIDE 900 MG/100ML
INJECTION INTRAVENOUS
Status: DISPENSED
Start: 2022-08-23 | End: 2022-08-24

## 2022-08-23 RX ORDER — ACETAMINOPHEN 325 MG/1
650 TABLET ORAL
Status: DISCONTINUED | OUTPATIENT
Start: 2022-08-23 | End: 2022-08-24

## 2022-08-23 RX ORDER — ACETAMINOPHEN 325 MG/1
650 TABLET ORAL
Status: DISCONTINUED | OUTPATIENT
Start: 2022-08-23 | End: 2022-08-25 | Stop reason: HOSPADM

## 2022-08-23 RX ORDER — OXYTOCIN/RINGER'S LACTATE 30/500 ML
PLASTIC BAG, INJECTION (ML) INTRAVENOUS
Status: COMPLETED
Start: 2022-08-23 | End: 2022-08-23

## 2022-08-23 RX ORDER — PROCHLORPERAZINE EDISYLATE 5 MG/ML
5 INJECTION INTRAMUSCULAR; INTRAVENOUS ONCE
Status: COMPLETED | OUTPATIENT
Start: 2022-08-23 | End: 2022-08-23

## 2022-08-23 RX ADMIN — OXYTOCIN 30000 MILLI-UNITS: 10 INJECTION, SOLUTION INTRAMUSCULAR; INTRAVENOUS at 12:59

## 2022-08-23 RX ADMIN — ACETAMINOPHEN 650 MG: 325 TABLET ORAL at 15:39

## 2022-08-23 RX ADMIN — SODIUM CHLORIDE, POTASSIUM CHLORIDE, SODIUM LACTATE AND CALCIUM CHLORIDE 999 ML/HR: 600; 310; 30; 20 INJECTION, SOLUTION INTRAVENOUS at 12:12

## 2022-08-23 RX ADMIN — ACETAMINOPHEN 650 MG: 325 TABLET ORAL at 20:01

## 2022-08-23 RX ADMIN — SODIUM CHLORIDE, POTASSIUM CHLORIDE, SODIUM LACTATE AND CALCIUM CHLORIDE 500 ML: 600; 310; 30; 20 INJECTION, SOLUTION INTRAVENOUS at 05:25

## 2022-08-23 RX ADMIN — TRANEXAMIC ACID 1000 MG: 1 INJECTION, SOLUTION INTRAVENOUS at 13:03

## 2022-08-23 RX ADMIN — MORPHINE SULFATE 4 MG: 4 INJECTION INTRAVENOUS at 05:29

## 2022-08-23 RX ADMIN — PROCHLORPERAZINE EDISYLATE 5 MG: 5 INJECTION INTRAMUSCULAR; INTRAVENOUS at 05:31

## 2022-08-23 NOTE — H&P
Labor History & Physical    Name: Tien Marie MRN: [id number]  SSN: [id number]    YOB: 1995  Age: 32 y.o. Sex: female        Subjective:     Estimated Date of Delivery: 22  OB History          2    Para   1    Term   1            AB        Living   1         SAB        IAB        Ectopic        Molar        Multiple   0    Live Births   1                Ms. Fab Aaron presents at 36w3d for with complaint of back pain, fatigue, aches, contractions. This is a di/di pregnancy, last sono a week ago, vtx/triana presentation. Pt denies any fever, chills, cough. Notes she left work early to rest and just hasn't been able to get comfortable. She notes she has not tried anything for pain. She ate normal today and states she has been drinking enough water, notes she had 3 regular bottles of water all day. [facility] \"[facility] up\" on the way to the hospital. She has been having contractions for the last 2-3 weeks. She notes she has been 2 cm dilated since 29 weeks. Denies any bleeding, leaking. Both babies active. No other complaints. Past Medical History:   Diagnosis Date    Genital herpes, unspecified     acyclovir no outbreaks     Past Surgical History:   Procedure Laterality Date    HX OTHER SURGICAL      cryosurgery  reported by patient     Social History     Occupational History    Not on file   Tobacco Use    Smoking status: Never    Smokeless tobacco: Never   Substance and Sexual Activity    Alcohol use: No    Drug use: Not on file    Sexual activity: Yes     Partners: Male     No family history on file. No Known Allergies  Prior to Admission medications    Medication Sig Start Date End Date Taking?  Authorizing Provider   valACYclovir (VALTREX) 500 mg tablet TAKE 1 TABLET (500 MG) BY ORAL ROUTE 2 TIMES PER DAY OR 2 TABLETS DAILY 22   Provider, Historical   prenatal  fum/folic/dha (PRENATAL-1 PO) Take 1 Tablet by mouth daily. Provider, Historical   pyridoxine, vitamin B6, (VITAMIN B-6) 100 mg tablet Take 100 mg by mouth daily. Provider, Historical   ferrous sulfate 325 mg (65 mg iron) tablet Take 325 mg by mouth daily. 7/6/22   Provider, Historical   cetirizine (ZYRTEC) 10 mg tablet Take 1 Tab by mouth daily. Patient not taking: Reported on 7/6/2022 4/28/17   JOSE Zambrano   triamcinolone (NASACORT) 55 mcg nasal inhaler 2 Sprays by Both Nostrils route daily. Patient not taking: Reported on 7/6/2022 1/10/17   Tiny Tavares FNP   pnv w/o calcium-iron fum-fa 27-1 mg tab Take 1 Tab by mouth daily. Provider, Historical   acyclovir (ZOVIRAX) 800 mg tablet Take 800 mg by mouth two (2) times a day. Provider, Historical        Review of Systems: Constitutional: negative  Eyes: negative  Respiratory: negative  Cardiovascular: negative  Gastrointestinal: positive for nausea, vomiting, and frequent B today, no diarrhea  Genitourinary:negative  Hematologic/lymphatic: negative  Musculoskeletal:positive for myalgias and back pain  Neurological: negative  Behavioral/Psych: negative    Objective:     Vitals: There were no vitals filed for this visit. Physical Exam:  Patient without distress. HEENT-NC/AT  Lungs-Normal respiratory effort  CV-Normal rate  Abd-gravid, soft, NT, ND, no rebound or guarding  -no vag bleeding, nl EFG  Cx- 3/70/-3  Membranes:  Intact  Fetal Heart Rate: Reactive  Baseline: Baby A : 125 beats per minute; Baby B: 130 beats per minute  Variability: moderate  Accelerations: yes  Decelerations: Baby A: no decels; Baby B: broken tracing        MS-no calf tenderness  Neuro-A&O x 4    Bedside US for presentation- Baby A- cephalic, Baby B- transverse lie, head to maternal right.    Prenatal Labs:   Lab Results   Component Value Date/Time    Rubella, External Immune  03/18/2015 12:00 AM    GrBStrep, External Positive  08/25/2015 12:00 AM    HBsAg, External Negative 03/18/2015 12:00 AM    HIV, External Negative  03/18/2015 12:00 AM    RPR, External Non-reactive  03/18/2015 12:00 AM    Gonorrhea, External Negative  03/18/2015 12:00 AM    Chlamydia, External Negative 03/18/2015 12:00 AM     Assessment/Plan:     Plan: Admit for  Observation. Will give tylenol and medication morphine and compazine for rest. Plan to reassess cervix for change in a few hours . Pt plans for vaginal delivery when in labor. Presentation confirmed today at bedside.      Signed By:  Mary Escalante MD     August 23, 2022   5:06 AM

## 2022-08-23 NOTE — PROGRESS NOTES
1900- Bedside shift change report given to MEKA Talavera Pain RN (oncoming nurse) by Will Card RNC (offgoing nurse). Report included the following information SBAR, Intake/Output, MAR, and Recent Results.

## 2022-08-23 NOTE — PROGRESS NOTES
8/23/2022  8:59 AM    CM met with LEONARDO to complete initial assessment and begin discharge planning. MOB verified and confirmed demographics. LEONARDO lives with HARPREET Mcintyre ( [phone]), along wit her 6yr old, at the address on file. LEONARDO is employed and plans to take 18wks off from work. FOERMIAS is also employed and will be taking adequate time off. LEONARDO reports she has good family support, and feels like she has the support she needs when she returns home. LEONARDO plans to breast  feed baby and would like information on how to order a pump. [facility] will provide follow up care for infant. LEONARDO has car seat, bassinet/crib, clothing, bottles and all necessary supplies for baby. LEONARDO has "[employer] howevere MOB noted that she will be adding babies to Medicaid. CM discussed process to add baby to insurance, LEONARDO verbalized understanding. Care Management Interventions  PCP Verified by CM: Yes ([facility])  Mode of Transport at Discharge:  Other (see comment)  Transition of Care Consult (CM Consult): Discharge Planning  Support Systems: Spouse/Significant Other, Other Family Member(s)  Confirm Follow Up Transport: Family  Discharge Location  Patient Expects to be Discharged to[de-identified] Home with family assistance  Josie Michel

## 2022-08-23 NOTE — PROGRESS NOTES
0700: Bedside and Verbal shift change report given to ALEX Pisano and Jalen Martinez, RN (oncoming nurse) by Gui Diaz RN (offgoing nurse). Report included the following information SBAR, Kardex, Procedure Summary, Intake/Output, MAR, Recent Results, Med Rec Status, and Quality Measures. 0718: Dr. Julian Plama at the bedside. SVE per MD- pt is 4/70-80/-2. Per MD- pt can be removed from the monitor with intermittent monitoring. Dr. Julian Palma will recheck pt at lunch time. 0945    Pt placed on EFM for intermittent monitoring at this time. Baby A . Baby B .    1009: GBS labs sent     1100    Pt placed on EFM for intermittent monitoring at this time. Baby A . Baby B .     1155    Pt stated her pain is increasing and she feels she is torsten more frequently than before. Call made to Dr. Julian Palma to notify him, no answer at this time. Notified charge RN. Awaiting call back. Pt in bathroom at this time. Assisted pt back to bed at 1205 to monitor and start pt on IV fluids as she is requesting to be checked and to prepare for epidural. 1212 LR started at 999ml/hr for epidural bolus per pt request, SVE by RN and Charge RN to confirm at [phone] 8/100/+2. Dr. Julian Palma called back at this time, this RN updated MD of pts progress. TOWRB to get epidural as soon as possible and treat patient for GBS proph for unknown status. (Swab sent this am as her appointment was scheduled for today)     [phone]    Call made to Qi Negrete CRNA and Patti Ask CRNA regarding need for epidural and notify of pts progress. 1227    Pt c/o increased pressure, L Sands at bedside with this RN and JAMEL Junior RN, SVE by Terrajoule anterior lip. Call made to Dr. Julian Palma at [phone] and CRNA, nursery notified while patient rolled in labor bed to OR2 in preparation for vaginal delivery.  Dr. Jeremy Rodgers on unit and notified and joined in [place].      1231 Pt in [address] in labor bed.   1234 Baby A , Baby B .  1234    Dr. Jeremy Rodgers in OR  1236    SVE by dr. Shannan Neil d/t pt c/o pressure, anterior lip  1237    US brought into OR.   1237    Dr. Walter Hyde in OR  1238    Nitrous given     1238    Patient actively pushing. Rns and Mds remains in continuous attendance at the bedside. Assessment & evaluation of fetal heart rate ongoing via continuous EFM. Nursey in [place] preparing for delivery. [address] and MDs remained at bedside throughout pushing. Vaginal delivery of viable male infant A. Apgars 9/9.      1241    Baby B   1243    Straight cath by MD milton with minimal output. [address] B confirmed vertex with ultrasound by Dr. Walter Hyde and Dr. Shannan Neil. 1245    AROM clear liquid of baby B by MD Milton. .   1246    VOWRB to start pit at 2ml/hr. Pit hung by this RN as ordered. 1247    Patient actively pushing. RNs and Mds remains in continuous attendance at the bedside. Assessment & evaluation of fetal heart rate ongoing via continuous EFM. MD Willow Meyer called to OR2 per MD Milton order, called by RN.  1249    Vacuum placed on table by tech per MD order. Not used at this time. 1250    NICU arrived in [place]. MD Willow Meyer requested to come to OR by MD milton, MD lloyd unable to come at this time due to being in [address] [place] [address] notified. 36    FSE applied to baby B by karine. Table instrument count by A Oneil ANDINO and Christianne Gavin OB Tech. [address] [place] [address] remained at bedside throughout pushing. EFM continuously assessed. Vaginal delivery of viable male infant B. Apgars 9/9.      1259    Pit at 999ml/hr per VOWRB from dr. Walter Hyde, initiated by this RN. 1300    Delivery of placentas A and B.   1302    Perineum intact per MD milton. 1303    TXA given per VOWRB from MD milton, given by this RN. 1304    Fundal performed by this RN, firm at U with moderate rubra. 1313    Pt and both babys out of OR on bed with Rns and back in room 209. Vitals stable. Bleeding stable.       Delivery EBL 300ml  2 Hour QBL 152ml  Total QBL 452 ml     1500    Pt ambulated to bathroom with RN, voided and ambulated back to bed. Bleeding stable, fundal firm at umbilicus and midline. 0    Pt voided and ambulated with RN for second time. 1840    Pt stood at bedside to get to chair and felt sharp chest pain. /66, pain subsided when sat back down in wheelchair. Dr. Brooke Marquis notified, pt cleared to transfer to MIU.      1850    TRANSFER - OUT REPORT:     Verbal report given to Kera Fernandez RN (name) on Arian Cue  being transferred to MIU room 314(unit) for routine progression of care        Report consisted of patients Situation, Background, Assessment and   Recommendations(SBAR). Information from the following report(s) SBAR, Procedure Summary, Intake/Output, MAR, Recent Results, and Med Rec Status was reviewed with the receiving nurse. Lines:        Peripheral IV 08/23/22 Anterior;Right Hand (Active)   Site Assessment Clean, dry, & intact 08/23/22 0745   Phlebitis Assessment 0 08/23/22 0745   Infiltration Assessment 0 08/23/22 0745   Dressing Status Clean, dry, & intact 08/23/22 0745   Dressing Type Tape;Transparent 08/23/22 0745   Hub Color/Line Status Blue 08/23/22 0745   Action Taken Blood drawn 08/23/22 0525   Alcohol Cap Used Yes 08/23/22 0745         Opportunity for questions and clarification was provided. Patient transported with:   Registered Nurse     Baby bands and fundal confirmed with receiving RN.

## 2022-08-23 NOTE — PROGRESS NOTES
0720 Bedside and Verbal shift change report given to Benjamin Zavala RN and KYLE Coelho RN (oncoming nurse) by Renee Cole RN (offgoing nurse). Report included the following information SBAR, Procedure Summary, Intake/Output, MAR, Recent Results, and Med Rec Status. This RN orienting and overseeing Almaz Davis RN.      9192 MD milton at bedside discussing poc. Plan to allow pt to ambulate and move as she tolerates with intermittent monitoring. Swab for GBS, clear liquids, and recheck in a few hours or if pt cannot tolerate pain or movement. VOWRB ok to intermittent monitor. Will continue to monitor pt under observation at this time. 0945 Pt placed on EFM for intermittent monitoring at this time. Baby A . Baby B .    1100  Pt placed on EFM for intermittent monitoring at this time. Baby A . Baby B .    1155 Pt stated her pain is increasing and she feels she is torsten more frequently than before. Call made to Dr. Tri Juan to notify him, no answer at this time. Notified charge RN. Awaiting call back. Pt in bathroom at this time. Assisted pt back to bed at 1205 to monitor and start pt on IV fluids as she is requesting to be checked and to prepare for epidural. 1212 LR started at 999ml/hr for epidural bolus per pt request, SVE by RN and Charge RN to confirm at [phone] 8/100/+2. Dr. Tri Juan called back at this time, this RN updated MD of pts progress. TOWRB to get epidural as soon as possible and treat patient for GBS proph for unknown status. (Swab sent this am as her appointment was scheduled for today)    [phone] Call made to Frankie Cat CRNA and Nito Faust CRNA regarding need for epidural and notify of pts progress. 65 Pt c/o increased pressure, MELISSA Sands at bedside with this RN and KYLE Coelho RN, SVE by [employer] [employer] anterior lip. Call made to Dr. Tri Juan at [phone] and CRNA, nursery notified while patient rolled in labor bed to OR2 in preparation for vaginal delivery.  Dr. Tellez [place] on unit and notified and joined in [place].     1231 Pt in [address] in labor bed.   1234 Baby A , Baby B .  1234 Dr. Hiram Negro in [place]  1236 SVE by dr. Hiram Negro d/t pt c/o pressure, anterior lip  1237 US brought into OR.   1237 Dr. Jared Madsen in OR  1238 Nitrous given    1238 Patient actively pushing. Rns and Mds remains in continuous attendance at the bedside. Assessment & evaluation of fetal heart rate ongoing via continuous EFM. Nursey in [address] preparing for delivery. [place] and MDs remained at bedside throughout pushing. Vaginal delivery of viable male infant A. Apgars 9/9.     1241 Baby B   1243 Straight cath by MD milton with minimal output. [address] B confirmed vertex with ultrasound by Dr. Jared Madsen and Dr. Hiram Negro. 1245 AROM clear liquid of baby B by MD Milton. .   1246 VOWRB to start pit at 2ml/hr. Pit hung by this RN as ordered. 1247 Patient actively pushing. RNs and Mds remains in continuous attendance at the bedside. Assessment & evaluation of fetal heart rate ongoing via continuous EFM. MD Rachel Morales called to OR2 per MD Milton order, called by RN.  1249 Vacuum placed on table by tech per MD order. Not used at this time. 1250 NICU arrived in [address]. MD Rachel Morales requested to come to OR by MD milton, MD lloyd unable to come at this time due to being in [place], Karine notified. 36 FSE applied to baby B by karine. Table instrument count by MARGARITO Riggs RN and Orlando Elise OB Tech. 2837 Bairon Nobles[place]reston A remained at bedside throughout pushing. EFM continuously assessed. Vaginal delivery of viable male infant B. Apgars 9/9.     1259 Pit at 999ml/hr per VOWRB from dr. Jared Madsen, initiated by this RN. 1300 Delivery of placentas A and B.   1302 Perineum intact per MD milton. 1303 TXA given per VOWRB from MD milton, given by this RN. 1304  Fundal performed by this RN, firm at U with moderate rubra. 1313 Pt and both babys out of OR on bed with Rns and back in room 209. Vitals stable. Bleeding stable.      Delivery EBL 300ml  2 Hour QBL 152ml  Total [id number] ml    1500 Pt ambulated to bathroom with RN, voided and ambulated back to bed. Bleeding stable, fundal firm at umbilicus and midline. 0 Pt voided and ambulated with RN for second time. 1840 Pt stood at bedside to get to chair and felt sharp chest pain. /66, pain subsided when sat back down in wheelchair. Dr. Demetrio Jay notified, pt cleared to transfer to MIU.     1850 TRANSFER - OUT REPORT:    Verbal report given to Spencer Alvarado RN (name) on Ludwin Corcoran  being transferred to MIU room 314(unit) for routine progression of care       Report consisted of patients Situation, Background, Assessment and   Recommendations(SBAR). Information from the following report(s) SBAR, Procedure Summary, Intake/Output, MAR, Recent Results, and Med Rec Status was reviewed with the receiving nurse. Lines:   Peripheral IV 08/23/22 Anterior;Right Hand (Active)   Site Assessment Clean, dry, & intact 08/23/22 0745   Phlebitis Assessment 0 08/23/22 0745   Infiltration Assessment 0 08/23/22 0745   Dressing Status Clean, dry, & intact 08/23/22 0745   Dressing Type Tape;Transparent 08/23/22 0745   Hub Color/Line Status Blue 08/23/22 0745   Action Taken Blood drawn 08/23/22 0525   Alcohol Cap Used Yes 08/23/22 0745        Opportunity for questions and clarification was provided. Patient transported with:   Registered Nurse    Baby bands and fundal confirmed with receiving RN.

## 2022-08-23 NOTE — PROGRESS NOTES
Labor Progress Note  Patient seen, fetal heart rate and contraction pattern evaluated, patient examined. Pt more uncomfortable  No data found. Physical Exam:  Cervical Exam:  4 cm dilated    70% effaced    -2 station    Membranes:  Intact  Uterine Activity: Frequency: Every 2-6 minutes  Fetal Heart Rate: Twin A 125 mod variability, no recent accels, no decels  Twin B: 130, mod variability, no recent accels, no decels. Assessment/Plan: Twin IUP at 39 3/7 weeks with early labor vs false labor. Will have her walk and recheck in a few hours or prn.    Reassuring fetal status, Continue plan for vaginal delivery

## 2022-08-23 NOTE — PROGRESS NOTES
Received pt from home, presented today with c/o body ache, lower abdominal pain and severe back pain, the pain started yesterday morning at about 955 am, deny LOF or vag bleeding.  KAREN  36w3d Di/Di twin Boys     0425 Dr García Memos made aware of pt's arrival and initial assessment     0430 Dr quesada in room assessing pt, tracing reviewed, SVE done, cervix 3cm dilated, U/S performed with Baby A vertex and Baby B Transverse lie, orders received     0630 pt is able to rest some after Morphine and compazine, fetal monitoring X2 reactive, pt woke up and wants to freshen up and void, off monitor to BR     0705 Bedside and Verbal shift change report given to THU Cat and THU Valdez (oncoming nurse) by Avelino Torres RN  (offgoing nurse). Report given with SBAR, Kardex and MAR.

## 2022-08-23 NOTE — PROCEDURES
Delivery Note    Obstetrician:  Antonio Downey MD    Assistant: Dr Chloe Beauchamp    Pre-Delivery Diagnosis:  pregnancy <37 weeks, Spontaneous labor, and Multifetal pregnancy    Post-Delivery Diagnosis: Living  infant(s) and Male    Intrapartum Event: None    Procedure: Spontaneous vaginal delivery    Epidural: NO    Monitor:  Fetal Heart Tones - External twin A, FSE twin B and Uterine Contractions - External    Indications for instrumental delivery: none    Estimated Blood Loss:  300 ml    Episiotomy: none    Laceration(s):  none    Laceration(s) repair: NO    Presentation: Cephalic x 2    Fetal Description: multiple gestation 2    Fetal Position: Occiput Anterior    Birth Weight: 5#3 twin A, 5#9 twin B    Birth Length: pending    Apgar - One Minute: 9 A and 9 B    Apgar - Five Minutes: 9 A&B    Umbilical Cord: 3 vessels present x 2  Specimens: placentas  Complications:  none    NOTE:  Twin A delivered spontaneously in the labor bed in OR 2. After delivery, ultrasound was used to determine that twin B was also vertex but floating. Over the next few minutes the vertex descended to ~-1 station and amniotomy was performed with clear fluid obtained. The Cervix was now c/w a rim. Fetal monitoring appeared to show a deceleration and with the contraction the patient was asked to try to push, past the cervix. Fetal vertex was in a left occiput transverse position at this time. With the next few contractions the vertex did not descend  much but FHR  tracing was reassuring. A scalp electrode was placed for better monitoring and the patient continued pushing with her UCs. Of note, pitocin was started shortly after delivery of Twin A as her contractions seemed to slow down. Over the next few minutes the vertex was observed to descent and rotation occurred to an OA position and twin B was delivered without incident. After delayed cord clamping it was handed over to pediatric personnel.   Placenta delivered spontaneously, intact x 2, 3vc x 2. Vagina, perineum and cervix explored and intact.             Cord Blood Results:   Information for the patient's :  [address] Pending Baby A [[id number]]   No results found for: Lonne Shade, BILI, ABORH   Information for the patient's :  [address] Pending Hernandez Rice [[id number]]   No results found for: PCTABR, PCTDIG, BILI, ABORH   Prenatal Labs:     Lab Results   Component Value Date/Time    ABO/Rh(D) O POSITIVE 2022 05:12 AM    HBsAg, External Negative 2015 12:00 AM    HIV, External Negative  2015 12:00 AM    Rubella, External Immune  2015 12:00 AM    RPR, External Non-reactive  2015 12:00 AM    Gonorrhea, External Negative  2015 12:00 AM    Chlamydia, External Negative 2015 12:00 AM    GrBStrep, External Positive  2015 12:00 AM        Attending Attestation: I was present and scrubbed for the entire procedure

## 2022-08-23 NOTE — DISCHARGE SUMMARY
Obstetrical Discharge Summary     Name: Asa Chin MRN: [id number]  SSN: [id number]    YOB: 1995  Age: 32 y.o. Sex: female      Allergies: Patient has no known allergies. Admit Date: 2022    Discharge Date: 2022    Admitting Physician: Eboni Richardson MD     Attending Physician:  Elfreda Runner, MD     * Admission Diagnoses: PRegnancy    * Discharge Diagnoses:   Information for the patient's :  Mikal Jean [[id number]]   Delivery of a   male infant via  on 2022 at 12:39 PM  by  . Apgars were   and  . Information for the patient's :  Mikal Jean [[id number]]   Delivery of a   male infant via  on 2022 at 12:55 PM  by  . Apgars were 8  and 9 . Additional Diagnoses:    Lab Results   Component Value Date/Time    ABO/Rh(D) O POSITIVE 2022 05:12 AM    Rubella, External Immune  2015 12:00 AM    GrBStrep, External Positive  2015 12:00 AM    ABO,Rh O Positive  2015 12:00 AM    There is no immunization history for the selected administration types on file for this patient. * Procedures: Twin vaginal delivery  * No surgery found *           * Discharge Condition: good    [facility] Course: Normal hospital course following the delivery. * Disposition: Home    Discharge Medications:   Current Discharge Medication List          * Follow-up Care/Patient Instructions:   Activity: No sex for 6 weeks and No heavy lifting for 6 weeks  Diet: Regular Diet  Wound Care: None needed    RTO in 4-6 weeks or prn    Follow-up Information    None

## 2022-08-24 PROCEDURE — 65270000029 HC RM PRIVATE

## 2022-08-24 PROCEDURE — 2709999900 HC NON-CHARGEABLE SUPPLY

## 2022-08-24 PROCEDURE — 74011250637 HC RX REV CODE- 250/637: Performed by: OBSTETRICS & GYNECOLOGY

## 2022-08-24 RX ORDER — HYDROCODONE BITARTRATE AND ACETAMINOPHEN 5; 325 MG/1; MG/1
2 TABLET ORAL
Status: DISCONTINUED | OUTPATIENT
Start: 2022-08-24 | End: 2022-08-25 | Stop reason: HOSPADM

## 2022-08-24 RX ADMIN — ACETAMINOPHEN 650 MG: 325 TABLET ORAL at 04:45

## 2022-08-24 RX ADMIN — ACETAMINOPHEN 650 MG: 325 TABLET ORAL at 08:31

## 2022-08-24 RX ADMIN — ACETAMINOPHEN 650 MG: 325 TABLET ORAL at 17:43

## 2022-08-24 NOTE — LACTATION NOTE
This note was copied from a baby's chart. LC did not observe this baby at breast.  Both times that I went into the room he had already fed. Mom states\" He just fed well on drops. \"

## 2022-08-24 NOTE — PROGRESS NOTES
Post-Partum Day Number 1 Progress Note    Patient doing well post-partum without significant complaint. Vitals:  Patient Vitals for the past 8 hrs:   BP Temp Pulse Resp SpO2   22 0833 107/73 97.9 °F (36.6 °C) 71 16 98 %     Temp (24hrs), Av.2 °F (36.8 °C), Min:97.6 °F (36.4 °C), Max:98.9 °F (37.2 °C)      Vital signs stable, afebrile. Exam:  Patient without distress. Abdomen soft, fundus firm at level of umbilicus, nontender                              Lower extremities are negative for swelling, cords or tenderness. Lab/Data Review: All lab results for the last 24 hours reviewed. Assessment and Plan:  Patient appears to be having uncomplicated post-partum course. Continue routine perineal care and maternal education. Plan discharge tomorrow if no problems occur.

## 2022-08-24 NOTE — LACTATION NOTE
This note was copied from a baby's chart. Pt will successfully establish breastfeeding by feeding in response to early feeding cues   or wake every 3h, will obtain deep latch, and will keep log of feedings/output. Taught to BF at hunger cues and or q 2-3 hrs and to offer 10-20 drops of hand expressed colostrum at any non-feeds. Breast Assessment  Left Breast: Large  Left Nipple: Flat, Intact  Right Breast: Large  Right Nipple: Flat, Intact  Breast- Feeding Assessment  Attends Breast-Feeding Classes: No  Breast-Feeding Experience: Yes (13 months)  Breast Trauma/Surgery: No  Type/Quality: Attempted  Lactation Consultant Visits  Breast-Feedings: Attempted breast-feeding  Mother/Infant Observation  Mother Observation: Alignment  Infant Observation: Frenulum checked  LATCH Documentation  Latch: Too sleepy or reluctant, no latch achieved  Audible Swallowing: None  Type of Nipple: Inverted  Comfort (Breast/Nipple): Soft/non-tender  Hold (Positioning): No assist from staff, mother able to position/hold infant  LATCH Score: 4     Mom holding baby in cradle position. Baby latched on shield, not sucking. Stimulated baby but baby did not feed. Baby skin to skin.

## 2022-08-24 NOTE — LACTATION NOTE
This note was copied from a baby's chart. Discussed with mother her plan for feeding. Reviewed the benefits of exclusive breast milk feeding during the hospital stay. Informed her of the risks of using formula to supplement in the first few days of life as well as the benefits of successful breast milk feeding; referred her to the Breastfeeding booklet about this information. She acknowledges understanding of information reviewed and states that it is her plan to breastfeed her infant. Will support her choice and offer additional information as needed. Reviewed breastfeeding basics:  How milk is made and normal  breastfeeding behaviors discussed. Supply and demand,  stomach size, early feeding cues, skin to skin bonding with comfortable positioning and baby led latch-on reviewed. How to identify signs of successful breastfeeding sessions reviewed; education on asymetrical latch, signs of effective latching vs shallow, in-effective latching, normal  feeding frequency and duration and expected infant output discussed. Normal course of breastfeeding discussed including the AAP's recommendation that children receive exclusive breast milk feedings for the first six months of life with breast milk feedings to continue through the first year of life and/or beyond as complimentary table foods are added. Breastfeeding Booklet and Warm line information provided with discussion. Discussed typical  weight loss and the importance of pediatrician appointment within 24-48 hours of discharge, at 2 weeks of life and normalcy of requesting pediatric weight checks as needed in between visits. Pt will successfully establish breastfeeding by feeding in response to early feeding cues   or wake every 3h, will obtain deep latch, and will keep log of feedings/output. Taught to BF at hunger cues and or q 2-3 hrs and to offer 10-20 drops of hand expressed colostrum at any non-feeds.       Breast Assessment  Left Breast: Large  Left Nipple: Flat, Intact  Right Breast: Large  Right Nipple: Flat, Intact  Breast- Feeding Assessment  Attends Breast-Feeding Classes: No  Breast-Feeding Experience: Yes (13 months)  Breast Trauma/Surgery: No  Type/Quality: Attempted  Lactation Consultant Visits  Breast-Feedings: Attempted breast-feeding  Mother/Infant Observation  Mother Observation: Alignment  Infant Observation: Frenulum checked  LATCH Documentation  Latch: Too sleepy or reluctant, no latch achieved  Audible Swallowing: None  Type of Nipple: Flat  Comfort (Breast/Nipple): Soft/non-tender  Hold (Positioning): Full assist, teach one side, mother does other, staff holds  [facility] Score: 4   Pumping:  Guidelines for pumping, milk collection and storage, proper cleaning of pump parts all reviewed. How to establish and maintain breast milk supply through pumping reviewed. Differences between hospital grade rental pumps vs store bought double electric/hand pumps discussed. Set up pumping with double electric set up. Assisted with pump session. List of area pump rental locations and lactation support services provided. `Normal feeding behaviors o 36 week twins discussed. Twins/multiples breastfeeding materials provided. Assisted with comfortable positioning both as singletons and in tandem. Introduction of pumping to maximize milk production discussed as per Academy of Breastfeeding Medicine Protocol #'s 5, 7 + 10. with regimen determined by infant's age and feeding behaviors. Mom finger fed baby 15 drops of EBM. Placed baby at breast but baby did not attempt to latch or feed. Stimulated baby and baby did not feed. Had Mom pump with symphony pump. Instructed her to pump at least 3 times per day and more if babies do not breastfeed well.

## 2022-08-25 VITALS
TEMPERATURE: 98.6 F | WEIGHT: 118 LBS | OXYGEN SATURATION: 97 % | SYSTOLIC BLOOD PRESSURE: 115 MMHG | HEART RATE: 56 BPM | BODY MASS INDEX: 20.91 KG/M2 | DIASTOLIC BLOOD PRESSURE: 74 MMHG | HEIGHT: 63 IN | RESPIRATION RATE: 16 BRPM

## 2022-08-25 PROCEDURE — 74011250637 HC RX REV CODE- 250/637: Performed by: OBSTETRICS & GYNECOLOGY

## 2022-08-25 PROCEDURE — 2709999900 HC NON-CHARGEABLE SUPPLY

## 2022-08-25 RX ADMIN — ACETAMINOPHEN 650 MG: 325 TABLET ORAL at 00:12

## 2022-08-25 RX ADMIN — ACETAMINOPHEN 650 MG: 325 TABLET ORAL at 09:19

## 2022-08-25 NOTE — LACTATION NOTE
This note was copied from a baby's chart. Assisted mother with postioning and latching babies to breast.  Baby A latched to left breast in football hold. Shield used. Baby stimulated to suck and was able to  a rhythmic suck with swallows. Baby B was placed in prone position on right side. Baby B was very drowsy and required regular stimulation to suck at breast.  Encouraged mother to give drops to baby B following feeding since baby was so drowsy. Lots of info reviewed. Printed info given. Chart shows numerous feedings, void, stool WNL. Discussed importance of monitoring outputs and feedings on first week of life. Discussed ways to tell if baby is  getting enough breast milk, ie  voids and stools, change in color of stool, and return to birth wt within 2 weeks. Follow up with pediatrician visit for weight check in 1-2 days (per AAP guidelines.)  Encouraged to call Warm Line  [phone]  for any questions/problems that arise. Mother also given breastfeeding support group dates and times for any future needs     Engorgement Care Guidelines:  Reviewed how milk is made and normal phases of milk production. Taught care of engorged breasts - frequent breastfeeding encouraged, cool packs and motrin as tolerated. Anticipatory guidance shared. Pt will successfully establish breastfeeding by feeding in response to early feeding cues   or wake every 3h, will obtain deep latch, and will keep log of feedings/output. Taught to BF at hunger cues and or q 2-3 hrs and to offer 10-20 drops of hand expressed colostrum at any non-feeds.       Breast Assessment  Left Breast: Medium  Left Nipple: Everted, Intact  Right Breast: Medium  Right Nipple: Everted, Intact  Breast- Feeding Assessment  Attends Breast-Feeding Classes: No  Breast-Feeding Experience: Yes (13 months)  Breast Trauma/Surgery: No  Type/Quality: Attempted  Lactation Consultant Visits  Breast-Feedings: Good   Mother/Infant Observation  Mother Observation: Breast comfortable, Close hold, Holds breast, Recognizes feeding cues, Lets baby end feeding, Sleepy after feeding  Infant Observation: Rhythmic suck, Relaxed after feeding, Lips flanged, upper, Opens mouth, Lips flanged, lower, Latches nipple and aereolae, Feeding cues, Audible swallows  LATCH Documentation  Latch: Grasps breast, tongue down, lips flanged, rhythmic sucking  Audible Swallowing: Spontaneous and intermittent (24 hours old)  Type of Nipple: Everted (after stimulation)  Comfort (Breast/Nipple): Soft/non-tender  Hold (Positioning): Full assist, teach one side, mother does other, staff holds  [facility]: 9

## 2022-08-25 NOTE — PROGRESS NOTES
PostPartum Note    Lisette Ashley  [id number]  1995  32 y.o.    S:  Ms. Lisette Ashley is a 32 y.o.  PPD #2 s/p  of twins with breech extraction @ 36w3d. Doing well. She hd baby boys. Her lochia is like a period. She describes her pain as mild and is well controlled with PO medications. She is breast feeding and this is going well. She is ambulating and voiding. Tolerating PO intake. O:   Visit Vitals  /74 (BP Patient Position: At rest)   Pulse (!) 56   Temp 98.6 °F (37 °C)   Resp 16   Ht 5' 3\" (1.6 m)   Wt 53.5 kg (118 lb)   SpO2 97%   Breastfeeding Unknown   BMI 20.90 kg/m²       Gen - No acute distress  Respirations - nonlabored   Abdomen - Fundus firm below the umbilicus   Ext - Warm, well perfused, nontender     Lab Results   Component Value Date/Time    WBC 9.3 2022 05:12 AM    HGB 10.6 (L) 2022 05:12 AM    HCT 33.9 (L) 2022 05:12 AM    PLATELET 009  05:12 AM    MCV 84.1 2022 05:12 AM    Hgb, External 12.1 2015 12:00 AM    Hct, External 37.1 2015 12:00 AM         A/P:  PPD #2 s/p  of boy twins with breech extraction of B @ 36w3d doing well. - Circs done  - Meeting all milestones  - warning s/sxs reviewed  -  F/U 4-6 weeks for PP check.       Shannon Connell MD  [facility]

## 2022-08-26 LAB
BACTERIA SPEC CULT: ABNORMAL
SERVICE CMNT-IMP: ABNORMAL